# Patient Record
Sex: FEMALE | Race: WHITE | Employment: OTHER | URBAN - METROPOLITAN AREA
[De-identification: names, ages, dates, MRNs, and addresses within clinical notes are randomized per-mention and may not be internally consistent; named-entity substitution may affect disease eponyms.]

---

## 2017-12-28 PROBLEM — R07.9 CHEST PAIN OF UNCERTAIN ETIOLOGY: Status: ACTIVE | Noted: 2017-12-28

## 2017-12-30 PROBLEM — I10 ESSENTIAL HYPERTENSION: Status: ACTIVE | Noted: 2017-12-30

## 2017-12-30 PROBLEM — E78.5 LIPIDS BLOOD INCREASED: Status: ACTIVE | Noted: 2017-12-30

## 2017-12-30 PROBLEM — E03.9 ACQUIRED HYPOTHYROIDISM: Status: ACTIVE | Noted: 2017-12-30

## 2017-12-30 PROBLEM — M54.12 RADICULITIS OF LEFT CERVICAL REGION: Status: ACTIVE | Noted: 2017-12-30

## 2019-03-25 RX ORDER — GABAPENTIN 300 MG/1
300 CAPSULE ORAL 2 TIMES DAILY
COMMUNITY
End: 2020-08-06

## 2019-03-25 RX ORDER — DULOXETIN HYDROCHLORIDE 30 MG/1
30 CAPSULE, DELAYED RELEASE ORAL DAILY
COMMUNITY
End: 2020-08-06

## 2019-03-25 RX ORDER — LEVOTHYROXINE SODIUM 88 UG/1
88 TABLET ORAL DAILY
COMMUNITY
End: 2020-08-06

## 2019-03-25 NOTE — PROGRESS NOTES
[facility] PRE-ADMISSION TESTING INSTRUCTIONS    The Preadmission Testing patient is instructed accordingly using the following criteria (check applicable):    ARRIVAL INSTRUCTIONS:  [x] Parking the day of Surgery is located in the Main Entrance lot. Upon entering the door, make an immediate right to the surgery reception desk    [x] Complimentary [place] Parking is available Monday through Friday 6 am to 6 pm    [x] Bring photo ID and insurance card    [] Bring in a copy of Living will or Durable Power of  papers. [x] Please be sure to arrange for responsible adult to provide transportation to and from the hospital    [x] Please arrange for responsible adult to be with you for the 24 hour period post procedure due to having anesthesia      GENERAL INSTRUCTIONS:    [x] Nothing by mouth after midnight, including gum, candy, mints or water    [x] You may brush your teeth, but do not swallow any water    [x] Take medications as instructed with 1-2 oz of water    [x] Stop herbal supplements and vitamins 5 days prior to procedure    [x] Follow preop dosing of blood thinners per physician instructions    [] Take 1/2 dose of evening insulin, but no insulin after midnight    [] No oral diabetic medications after midnight    [] If diabetic and have low blood sugar or feel symptomatic, take 1-2oz apple juice only    [] Bring inhalers day of surgery    [] Bring C-PAP/ Bi-Pap day of surgery    [] Bring urine specimen day of surgery    [x] Shower or bath with soap, lather and rinse well, AM of Surgery, no lotion, powders or creams to surgical site    [] Follow bowel prep as instructed per surgeon    [x] No tobacco products within 24 hours of surgery     [x] No alcohol or illegal drug use within 24 hours of surgery.     [x] Jewelry, body piercing's, eyeglasses, contact lenses and dentures are not permitted into surgery (bring cases)      [x] Please do not wear any nail polish, make up or hair

## 2019-04-02 ENCOUNTER — ANESTHESIA EVENT (OUTPATIENT)
Dept: OPERATING ROOM | Age: 69
End: 2019-04-02
Payer: MEDICARE

## 2019-04-02 ENCOUNTER — HOSPITAL ENCOUNTER (OUTPATIENT)
Age: 69
Setting detail: OUTPATIENT SURGERY
Discharge: HOME | End: 2019-04-02
Attending: OPHTHALMOLOGY | Admitting: OPHTHALMOLOGY
Payer: MEDICARE

## 2019-04-02 ENCOUNTER — ANESTHESIA (OUTPATIENT)
Dept: OPERATING ROOM | Age: 69
End: 2019-04-02
Payer: MEDICARE

## 2019-04-02 VITALS — SYSTOLIC BLOOD PRESSURE: 188 MMHG | DIASTOLIC BLOOD PRESSURE: 76 MMHG | OXYGEN SATURATION: 100 %

## 2019-04-02 VITALS
SYSTOLIC BLOOD PRESSURE: 170 MMHG | HEIGHT: 59 IN | DIASTOLIC BLOOD PRESSURE: 74 MMHG | HEART RATE: 66 BPM | WEIGHT: 145 LBS | RESPIRATION RATE: 16 BRPM | TEMPERATURE: 98.9 F | BODY MASS INDEX: 29.23 KG/M2 | OXYGEN SATURATION: 97 %

## 2019-04-02 PROBLEM — H25.812 COMBINED FORMS OF AGE-RELATED CATARACT OF LEFT EYE: Status: ACTIVE | Noted: 2019-04-02

## 2019-04-02 PROCEDURE — 2500000003 HC RX 250 WO HCPCS: Performed by: OPHTHALMOLOGY

## 2019-04-02 PROCEDURE — 7100000010 HC PHASE II RECOVERY - FIRST 15 MIN: Performed by: OPHTHALMOLOGY

## 2019-04-02 PROCEDURE — 3600000012 HC SURGERY LEVEL 2 ADDTL 15MIN: Performed by: OPHTHALMOLOGY

## 2019-04-02 PROCEDURE — 7100000011 HC PHASE II RECOVERY - ADDTL 15 MIN: Performed by: OPHTHALMOLOGY

## 2019-04-02 PROCEDURE — 6370000000 HC RX 637 (ALT 250 FOR IP): Performed by: OPHTHALMOLOGY

## 2019-04-02 PROCEDURE — 6360000002 HC RX W HCPCS: Performed by: OPHTHALMOLOGY

## 2019-04-02 PROCEDURE — 2709999900 HC NON-CHARGEABLE SUPPLY: Performed by: OPHTHALMOLOGY

## 2019-04-02 PROCEDURE — 3700000000 HC ANESTHESIA ATTENDED CARE: Performed by: OPHTHALMOLOGY

## 2019-04-02 PROCEDURE — 3700000001 HC ADD 15 MINUTES (ANESTHESIA): Performed by: OPHTHALMOLOGY

## 2019-04-02 PROCEDURE — 3600000002 HC SURGERY LEVEL 2 BASE: Performed by: OPHTHALMOLOGY

## 2019-04-02 PROCEDURE — V2632 POST CHMBR INTRAOCULAR LENS: HCPCS | Performed by: OPHTHALMOLOGY

## 2019-04-02 PROCEDURE — 6360000002 HC RX W HCPCS: Performed by: NURSE ANESTHETIST, CERTIFIED REGISTERED

## 2019-04-02 PROCEDURE — 2580000003 HC RX 258: Performed by: OPHTHALMOLOGY

## 2019-04-02 DEVICE — LENS INTOCU +26.0 DIOPT L13MM DIA6MM 0DEG HAPTIC ANG A: Type: IMPLANTABLE DEVICE | Site: EYE | Status: FUNCTIONAL

## 2019-04-02 RX ORDER — MOXIFLOXACIN 5 MG/ML
SOLUTION/ DROPS OPHTHALMIC PRN
Status: DISCONTINUED | OUTPATIENT
Start: 2019-04-02 | End: 2019-04-02 | Stop reason: ALTCHOICE

## 2019-04-02 RX ORDER — SODIUM CHLORIDE 9 MG/ML
INJECTION, SOLUTION INTRAVENOUS CONTINUOUS
Status: DISCONTINUED | OUTPATIENT
Start: 2019-04-02 | End: 2019-04-02 | Stop reason: HOSPADM

## 2019-04-02 RX ORDER — TROPICAMIDE 10 MG/ML
1 SOLUTION/ DROPS OPHTHALMIC
Status: COMPLETED | OUTPATIENT
Start: 2019-04-02 | End: 2019-04-02

## 2019-04-02 RX ORDER — KETOROLAC TROMETHAMINE 5 MG/ML
1 SOLUTION OPHTHALMIC
Status: COMPLETED | OUTPATIENT
Start: 2019-04-02 | End: 2019-04-02

## 2019-04-02 RX ORDER — MOXIFLOXACIN 5 MG/ML
1 SOLUTION/ DROPS OPHTHALMIC
Status: COMPLETED | OUTPATIENT
Start: 2019-04-02 | End: 2019-04-02

## 2019-04-02 RX ORDER — TETRACAINE HYDROCHLORIDE 5 MG/ML
SOLUTION OPHTHALMIC PRN
Status: DISCONTINUED | OUTPATIENT
Start: 2019-04-02 | End: 2019-04-02 | Stop reason: ALTCHOICE

## 2019-04-02 RX ORDER — CYCLOPENTOLATE HYDROCHLORIDE 10 MG/ML
1 SOLUTION/ DROPS OPHTHALMIC
Status: COMPLETED | OUTPATIENT
Start: 2019-04-02 | End: 2019-04-02

## 2019-04-02 RX ORDER — FENTANYL CITRATE 50 UG/ML
INJECTION, SOLUTION INTRAMUSCULAR; INTRAVENOUS PRN
Status: DISCONTINUED | OUTPATIENT
Start: 2019-04-02 | End: 2019-04-02 | Stop reason: SDUPTHER

## 2019-04-02 RX ORDER — PROPOFOL 10 MG/ML
INJECTION, EMULSION INTRAVENOUS PRN
Status: DISCONTINUED | OUTPATIENT
Start: 2019-04-02 | End: 2019-04-02 | Stop reason: SDUPTHER

## 2019-04-02 RX ORDER — PHENYLEPHRINE HCL 2.5 %
1 DROPS OPHTHALMIC (EYE)
Status: COMPLETED | OUTPATIENT
Start: 2019-04-02 | End: 2019-04-02

## 2019-04-02 RX ORDER — PREDNISOLONE ACETATE 10 MG/ML
SUSPENSION/ DROPS OPHTHALMIC PRN
Status: DISCONTINUED | OUTPATIENT
Start: 2019-04-02 | End: 2019-04-02 | Stop reason: ALTCHOICE

## 2019-04-02 RX ADMIN — KETOROLAC TROMETHAMINE 1 DROP: 5 SOLUTION OPHTHALMIC at 09:43

## 2019-04-02 RX ADMIN — Medication 1 DROP: at 09:42

## 2019-04-02 RX ADMIN — CYCLOPENTOLATE HYDROCHLORIDE 1 DROP: 10 SOLUTION/ DROPS OPHTHALMIC at 09:35

## 2019-04-02 RX ADMIN — PROPOFOL 100 MG: 10 INJECTION, EMULSION INTRAVENOUS at 09:51

## 2019-04-02 RX ADMIN — CYCLOPENTOLATE HYDROCHLORIDE 1 DROP: 10 SOLUTION/ DROPS OPHTHALMIC at 09:43

## 2019-04-02 RX ADMIN — Medication 1 DROP: at 09:35

## 2019-04-02 RX ADMIN — Medication 1 DROP: at 09:10

## 2019-04-02 RX ADMIN — CYCLOPENTOLATE HYDROCHLORIDE 1 DROP: 10 SOLUTION/ DROPS OPHTHALMIC at 09:20

## 2019-04-02 RX ADMIN — FENTANYL CITRATE 100 MCG: 50 INJECTION, SOLUTION INTRAMUSCULAR; INTRAVENOUS at 10:23

## 2019-04-02 RX ADMIN — KETOROLAC TROMETHAMINE 1 DROP: 5 SOLUTION OPHTHALMIC at 09:35

## 2019-04-02 RX ADMIN — PHENYLEPHRINE HYDROCHLORIDE 1 DROP: 25 SOLUTION/ DROPS OPHTHALMIC at 09:42

## 2019-04-02 RX ADMIN — KETOROLAC TROMETHAMINE 1 DROP: 5 SOLUTION OPHTHALMIC at 09:20

## 2019-04-02 RX ADMIN — CYCLOPENTOLATE HYDROCHLORIDE 1 DROP: 10 SOLUTION/ DROPS OPHTHALMIC at 09:10

## 2019-04-02 RX ADMIN — KETOROLAC TROMETHAMINE 1 DROP: 5 SOLUTION OPHTHALMIC at 09:10

## 2019-04-02 RX ADMIN — SODIUM CHLORIDE: 9 INJECTION, SOLUTION INTRAVENOUS at 09:46

## 2019-04-02 RX ADMIN — PHENYLEPHRINE HYDROCHLORIDE 1 DROP: 25 SOLUTION/ DROPS OPHTHALMIC at 09:10

## 2019-04-02 RX ADMIN — PHENYLEPHRINE HYDROCHLORIDE 1 DROP: 25 SOLUTION/ DROPS OPHTHALMIC at 09:20

## 2019-04-02 RX ADMIN — Medication 1 DROP: at 09:20

## 2019-04-02 RX ADMIN — PHENYLEPHRINE HYDROCHLORIDE 1 DROP: 25 SOLUTION/ DROPS OPHTHALMIC at 09:35

## 2019-04-02 ASSESSMENT — PULMONARY FUNCTION TESTS
PIF_VALUE: 0
PIF_VALUE: 1
PIF_VALUE: 0

## 2019-04-02 ASSESSMENT — PAIN SCALES - GENERAL
PAINLEVEL_OUTOF10: 0

## 2019-04-02 ASSESSMENT — PAIN - FUNCTIONAL ASSESSMENT: PAIN_FUNCTIONAL_ASSESSMENT: 0-10

## 2019-04-02 NOTE — ANESTHESIA PRE PROCEDURE
Department of Anesthesiology  Preprocedure Note       Name:  Yola Hampton   Age:  70 y.o.  :  1950                                          MRN:  [id number]         Date:  2019      Surgeon: Quinn Zimmerman):  Fenton Dancer, MD    Procedure: LEFT EYE CATARACT EXTRACTION WITH  IOL IMPLANT (Left Eye)    Medications prior to admission:   Prior to Admission medications    Medication Sig Start Date End Date Taking? Authorizing Provider   levothyroxine (SYNTHROID) 88 MCG tablet Take 88 mcg by mouth Daily   Yes Historical Provider, MD   DULoxetine (CYMBALTA) 30 MG extended release capsule Take 30 mg by mouth daily Instructed to take am of procedure   Yes Historical Provider, MD   ezetimibe (ZETIA) 10 MG tablet Take 10 mg by mouth daily   Yes Historical Provider, MD   loratadine (CLARITIN) 10 MG tablet Take 10 mg by mouth daily   Yes Historical Provider, MD   atorvastatin (LIPITOR) 10 MG tablet Take 10 mg by mouth three times a week On Monday, Wednesday, and Friday   Yes Historical Provider, MD   temazepam (RESTORIL) 15 MG capsule Take 15 mg by mouth nightly as needed for Sleep . Yes Historical Provider, MD   montelukast (SINGULAIR) 10 MG tablet Take 10 mg by mouth nightly   Yes Historical Provider, MD   diltiazem (DILACOR XR) 240 MG XR capsule Take 240 mg by mouth daily Instructed to take am of procedure   Yes Historical Provider, MD   colestipol (COLESTID) 1 G tablet Take 1 g by mouth daily   Yes Historical Provider, MD   hydrochlorothiazide (MICROZIDE) 12.5 MG capsule Take 12.5 mg by mouth daily Instructed to take am of procedure   Yes Historical Provider, MD   gabapentin (NEURONTIN) 300 MG capsule Take 300 mg by mouth 2 times daily.  Instructed to take am of procedure    Historical Provider, MD   Triamcinolone Acetonide (NASACORT ALLERGY 24HR NA) by Nasal route    Historical Provider, MD   ibuprofen (ADVIL;MOTRIN) 600 MG tablet Take 1 tablet by mouth 4 times daily (with meals and nightly) 17 Nisa Arreaga DO   meclizine (ANTIVERT) 25 MG tablet Take 25 mg by mouth 3 times daily as needed for Dizziness    Historical Provider, MD   promethazine (PHENERGAN) 25 MG tablet Take 25 mg by mouth every 6 hours as needed for Nausea    Historical Provider, MD   aspirin 81 MG tablet Take 81 mg by mouth daily Ld 3/21/2019 per dr. Maria Isabel Booth Provider, MD   Calcium Carb-Cholecalciferol (CALCIUM + D3 PO) Take 2 tablets by mouth daily Calcium 500 mg /Vitamin D3 1000 units  Ld 3/27/2019    Historical Provider, MD       Current medications:    Current Facility-Administered Medications   Medication Dose Route Frequency Provider Last Rate Last Dose    tropicamide (MYDRIACYL) 1 % ophthalmic solution 1 drop  1 drop Left Eye Q15 Min PRN Cherelle Balbuena MD   1 drop at 04/02/19 0920    phenylephrine (MYDFRIN) 2.5 % ophthalmic solution 1 drop  1 drop Left Eye Q15 Min PRN Cherelle Balbuena MD   1 drop at 04/02/19 0920    cyclopentolate (CYCLOGYL) 1 % ophthalmic solution 1 drop  1 drop Left Eye Q15 Min PRN Cherelle Balbuena MD   1 drop at 04/02/19 0920    ketorolac (ACULAR) 0.5 % ophthalmic solution 1 drop  1 drop Left Eye Q15 Min PRN Cherelle Balbuena MD   1 drop at 04/02/19 0920    0.9 % sodium chloride infusion   Intravenous Continuous Cherelle Balbuena MD        moxifloxacin (VIGAMOX) 0.5 % ophthalmic solution 1 drop  1 drop Left Eye Q15 Min PRN Cherelle Balbuena MD   1 drop at 04/02/19 0920       Allergies:  No Known Allergies    Problem List:    Patient Active Problem List   Diagnosis Code    Chest pain of uncertain etiology Z31.36    Essential hypertension I10    Lipids blood increased E78.5    Radiculitis of left cervical region M54.12    Acquired hypothyroidism E03.9       Past Medical History:        Diagnosis Date    Acquired hypothyroidism 12/30/2017    Cataract, left eye     Essential hypertension 12/30/2017    Hyperlipidemia     Hypertension     Radiculitis of left cervical region 2017    Thyroid disease        Past Surgical History:        Procedure Laterality Date     SECTION      x 2    COLONOSCOPY      EYE SURGERY Right 2016    removal of cataract right eye with IOL implant    PELVIC LAPAROSCOPY         Social History:    Social History     Tobacco Use    Smoking status: Former Smoker     Last attempt to quit: 2011     Years since quittin.1    Smokeless tobacco: Never Used   Substance Use Topics    Alcohol use: Yes     Comment: weekends / 2 glasses wine                                Counseling given: Not Answered      Vital Signs (Current):   Vitals:    19 0925 19 0916   BP:  (!) 168/74   Pulse:  66   Resp:  16   Temp:  97.2 °F (36.2 °C)   TempSrc:  Temporal   SpO2:  97%   Weight: 145 lb (65.8 kg) 145 lb (65.8 kg)   Height: 4' 11\" (1.499 m) 4' 11\" (1.499 m)                                              BP Readings from Last 3 Encounters:   19 (!) 168/74   17 (!) 145/66   16 139/65       NPO Status: Time of last liquid consumption: 1700                        Time of last solid consumption: 1700                        Date of last liquid consumption: 19                        Date of last solid food consumption: 19    BMI:   Wt Readings from Last 3 Encounters:   19 145 lb (65.8 kg)   17 140 lb 8 oz (63.7 kg)   16 145 lb (65.8 kg)     Body mass index is 29.29 kg/m².     CBC:   Lab Results   Component Value Date    WBC 11.5 2017    RBC 4.30 2017    HGB 13.3 2017    HCT 40.0 2017    MCV 93.0 2017    RDW 13.0 2017     2017       CMP:   Lab Results   Component Value Date     2017    K 4.0 2017     2017    CO2 23 2017    BUN 18 2017    CREATININE 0.8 2017    GFRAA >60 2017    LABGLOM >60 2017    GLUCOSE 100 2017    PROT 6.2 2017    CALCIUM 8.3 12/29/2017    BILITOT 0.3 12/29/2017    ALKPHOS 67 12/29/2017    AST 13 12/29/2017    ALT 14 12/29/2017       POC Tests: No results for input(s): POCGLU, POCNA, POCK, POCCL, POCBUN, POCHEMO, POCHCT in the last 72 hours. Coags: No results found for: PROTIME, INR, APTT    HCG (If Applicable): No results found for: PREGTESTUR, PREGSERUM, HCG, HCGQUANT     ABGs: No results found for: PHART, PO2ART, GCH2QTT, CPF8SYQ, BEART, O8YWIAKS     Type & Screen (If Applicable):  No results found for: [facility]    Anesthesia Evaluation  Patient summary reviewed no history of anesthetic complications:   Airway: Mallampati: II  TM distance: >3 FB   Neck ROM: full  Mouth opening: > = 3 FB Dental:    (+) caps      Pulmonary:Negative Pulmonary ROS breath sounds clear to auscultation                             Cardiovascular:    (+) hypertension:, hyperlipidemia        Rhythm: regular             Beta Blocker:  Not on Beta Blocker         Neuro/Psych:   (+) neuromuscular disease:, psychiatric history: stable with treatment            GI/Hepatic/Renal: Neg GI/Hepatic/Renal ROS            Endo/Other:    (+) hypothyroidism::., .                 Abdominal:           Vascular: negative vascular ROS. Anesthesia Plan      MAC     ASA 3       Induction: intravenous. Anesthetic plan and risks discussed with patient. Plan discussed with CRNA.             [address] Sarath Prieto,    4/2/2019

## 2019-04-02 NOTE — ANESTHESIA POSTPROCEDURE EVALUATION
Department of Anesthesiology  Postprocedure Note    Patient: Tiffanie Rodriguez  MRN: [id number]  Birthdate: [birth year]  Date of evaluation: 4/2/2019  Time:  1:13 PM     Procedure Summary     Date:  04/02/19 Room / Location:  [facility] OR 03 / [facility] OR    Anesthesia Start:  0946 Anesthesia Stop:  9341    Procedure:  LEFT EYE CATARACT EXTRACTION WITH  IOL IMPLANT (Left Eye) Diagnosis:  (LEFT EYE CATARACT)    Surgeon:  Luis Fernando aLrkin MD Responsible Provider:  Ethan Frank DO    Anesthesia Type:  MAC ASA Status:  3          Anesthesia Type: MAC    Mary Ann Phase I: Mary Ann Score: 10    Mary Ann Phase II: Mary Ann Score: 10    Last vitals: Reviewed and per EMR flowsheets.        Anesthesia Post Evaluation    Patient location during evaluation: PACU  Patient participation: complete - patient participated  Level of consciousness: awake and alert  Airway patency: patent  Nausea & Vomiting: no nausea and no vomiting  Complications: no  Cardiovascular status: hemodynamically stable  Respiratory status: acceptable  Hydration status: euvolemic

## 2019-04-02 NOTE — OP NOTE
needle was then used to create the capsulorhexis. The capsule was then very carefully removed. Hydrodissection with hydrodelineation were the performed in the four quadrants with sterile balanced salt solution. The nucleus and epinucleus were then removed with phacoemulsification. Poor iris tone noted. The residual cortex was then removed using an aspiration/irrigation unit. The capsular bag was then reconstituted with Provisc. A posterior chamber intraocular lens was then injected into the capsular bag. An SN 60 WF 26.0 D lens was used. The aspiration-irrigation united was then used to remove the residual Provisc. The wound was then tested and found to be watertight. One sutures was then placed through the flap, then closed and tied. The iris hooks were then removed. The pupil was then brought down with Miostat. The conjunctiva was then carefully draped over the surgical wound and then cauterized. The lid speculum  was removed. Pred Forte 1% solution,  Vigamox drops and TobraDex ointment  were then instilled into the eye. The eye was noted to be in good condition. A patch and shield were then placed over the operated eye. The patient was transferred to the recovery room in good condition. Lb Corrigan M.D.

## 2020-02-03 ENCOUNTER — OFFICE VISIT (OUTPATIENT)
Dept: FAMILY MEDICINE CLINIC | Age: 70
End: 2020-02-03
Payer: MEDICARE

## 2020-02-03 VITALS
DIASTOLIC BLOOD PRESSURE: 70 MMHG | WEIGHT: 149 LBS | HEART RATE: 86 BPM | BODY MASS INDEX: 30.09 KG/M2 | SYSTOLIC BLOOD PRESSURE: 150 MMHG | RESPIRATION RATE: 16 BRPM | OXYGEN SATURATION: 97 % | TEMPERATURE: 97.7 F

## 2020-02-03 PROCEDURE — 99213 OFFICE O/P EST LOW 20 MIN: CPT | Performed by: FAMILY MEDICINE

## 2020-02-03 RX ORDER — FAMCICLOVIR 500 MG/1
500 TABLET, FILM COATED ORAL 3 TIMES DAILY
Qty: 21 TABLET | Refills: 0 | Status: SHIPPED | OUTPATIENT
Start: 2020-02-03 | End: 2020-02-10

## 2020-02-03 ASSESSMENT — ENCOUNTER SYMPTOMS
RESPIRATORY NEGATIVE: 1
EYES NEGATIVE: 1

## 2020-02-03 NOTE — PROGRESS NOTES
2/3/20  Alexander Whitehead : 1950 Sex: female  Age: 78 y.o. Chief Complaint   Patient presents with    Rash     Rash on Rt side near ribs. States rash feels burning and hot. Onset 3 days       A 69-year-old white female with a chief complaint of rash in the left intercostal area associated with pain is a maculopapular rash with no vesiculation. Review of Systems   HENT: Negative. Eyes: Negative. Respiratory: Negative. Cardiovascular: Negative. Skin: Positive for rash. Current Outpatient Medications:     famciclovir (FAMVIR) 500 MG tablet, Take 1 tablet by mouth 3 times daily for 7 days, Disp: 21 tablet, Rfl: 0    gabapentin (NEURONTIN) 300 MG capsule, Take 300 mg by mouth 2 times daily.  Instructed to take am of procedure, Disp: , Rfl:     levothyroxine (SYNTHROID) 88 MCG tablet, Take 88 mcg by mouth Daily, Disp: , Rfl:     Triamcinolone Acetonide (NASACORT ALLERGY 24HR NA), by Nasal route, Disp: , Rfl:     DULoxetine (CYMBALTA) 30 MG extended release capsule, Take 30 mg by mouth daily Instructed to take am of procedure, Disp: , Rfl:     ibuprofen (ADVIL;MOTRIN) 600 MG tablet, Take 1 tablet by mouth 4 times daily (with meals and nightly), Disp: 120 tablet, Rfl: 3    ezetimibe (ZETIA) 10 MG tablet, Take 10 mg by mouth daily, Disp: , Rfl:     loratadine (CLARITIN) 10 MG tablet, Take 10 mg by mouth daily, Disp: , Rfl:     atorvastatin (LIPITOR) 10 MG tablet, Take 10 mg by mouth three times a week On Monday, Wednesday, and Friday, Disp: , Rfl:     meclizine (ANTIVERT) 25 MG tablet, Take 25 mg by mouth 3 times daily as needed for Dizziness, Disp: , Rfl:     promethazine (PHENERGAN) 25 MG tablet, Take 25 mg by mouth every 6 hours as needed for Nausea, Disp: , Rfl:     temazepam (RESTORIL) 15 MG capsule, Take 15 mg by mouth nightly as needed for Sleep ., Disp: , Rfl:     montelukast (SINGULAIR) 10 MG tablet, Take 10 mg by mouth nightly, Disp: , Rfl:     diltiazem (DILACOR XR) 240 MG XR capsule, Take 240 mg by mouth daily Instructed to take am of procedure, Disp: , Rfl:     aspirin 81 MG tablet, Take 81 mg by mouth daily Ld 3/21/2019 per dr. Sarah Escobedo: , Rfl:     colestipol (COLESTID) 1 G tablet, Take 1 g by mouth daily, Disp: , Rfl:     hydrochlorothiazide (MICROZIDE) 12.5 MG capsule, Take 12.5 mg by mouth daily Instructed to take am of procedure, Disp: , Rfl:     Calcium Carb-Cholecalciferol (CALCIUM + D3 PO), Take 2 tablets by mouth daily Calcium 500 mg /Vitamin D3 1000 units Ld 3/27/2019, Disp: , Rfl:   No Known Allergies    Past Medical History:   Diagnosis Date    Acquired hypothyroidism 2017    Cataract, left eye     Essential hypertension 2017    Hyperlipidemia     Hypertension     Radiculitis of left cervical region 2017    Thyroid disease      Past Surgical History:   Procedure Laterality Date     SECTION      x 2    COLONOSCOPY      EYE SURGERY Right 2016    removal of cataract right eye with IOL implant    INTRACAPSULAR CATARACT EXTRACTION Left 2019    LEFT EYE CATARACT EXTRACTION WITH  IOL IMPLANT performed by Eric Koyanagi, [facility] at [facility]       No family history on file. Social History     Tobacco Use    Smoking status: Former Smoker     Last attempt to quit: 2011     Years since quittin.0    Smokeless tobacco: Never Used   Substance Use Topics    Alcohol use: Yes     Comment: weekends / 2 glasses wine    Drug use: No        Vitals:    20 1250   BP: (!) 150/70   Pulse: 86   Resp: 16   Temp: 97.7 °F (36.5 °C)   TempSrc: Temporal   SpO2: 97%   Weight: 149 lb (67.6 kg)       Physical Exam  Constitutional:       Appearance: Normal appearance. HENT:      Head: Normocephalic. Skin:     Findings: Erythema, lesion and rash present. Neurological:      Mental Status: She is alert. Assessment and Plan:  Kike Crandall was seen today for rash.     Diagnoses and all

## 2020-08-06 ENCOUNTER — OFFICE VISIT (OUTPATIENT)
Dept: PRIMARY CARE CLINIC | Age: 70
End: 2020-08-06
Payer: MEDICARE

## 2020-08-06 VITALS
OXYGEN SATURATION: 98 % | WEIGHT: 144 LBS | SYSTOLIC BLOOD PRESSURE: 144 MMHG | HEART RATE: 82 BPM | DIASTOLIC BLOOD PRESSURE: 68 MMHG | TEMPERATURE: 97.7 F | BODY MASS INDEX: 29.08 KG/M2

## 2020-08-06 PROBLEM — E78.5 LIPIDS BLOOD INCREASED: Status: RESOLVED | Noted: 2017-12-30 | Resolved: 2020-08-06

## 2020-08-06 PROBLEM — R07.9 CHEST PAIN OF UNCERTAIN ETIOLOGY: Status: RESOLVED | Noted: 2017-12-28 | Resolved: 2020-08-06

## 2020-08-06 PROBLEM — H25.812 COMBINED FORMS OF AGE-RELATED CATARACT OF LEFT EYE: Status: RESOLVED | Noted: 2019-04-02 | Resolved: 2020-08-06

## 2020-08-06 PROCEDURE — 99204 OFFICE O/P NEW MOD 45 MIN: CPT | Performed by: FAMILY MEDICINE

## 2020-08-06 RX ORDER — MONTELUKAST SODIUM 4 MG/1
TABLET, CHEWABLE ORAL
COMMUNITY
End: 2021-02-08

## 2020-08-06 RX ORDER — HYDROCHLOROTHIAZIDE 12.5 MG/1
TABLET ORAL
COMMUNITY
End: 2020-08-06 | Stop reason: SDUPTHER

## 2020-08-06 RX ORDER — LEVOTHYROXINE SODIUM 88 UG/1
88 TABLET ORAL DAILY
Qty: 90 TABLET | Refills: 1 | Status: SHIPPED
Start: 2020-08-06 | End: 2021-02-08

## 2020-08-06 RX ORDER — ATORVASTATIN CALCIUM 10 MG/1
10 TABLET, FILM COATED ORAL
Qty: 36 TABLET | Refills: 1 | Status: SHIPPED
Start: 2020-08-07 | End: 2021-09-28

## 2020-08-06 RX ORDER — LEVOTHYROXINE SODIUM 0.07 MG/1
TABLET ORAL
COMMUNITY
End: 2020-08-06 | Stop reason: SDUPTHER

## 2020-08-06 RX ORDER — EZETIMIBE 10 MG/1
10 TABLET ORAL DAILY
Qty: 90 TABLET | Refills: 1 | Status: SHIPPED
Start: 2020-08-06 | End: 2021-02-08

## 2020-08-06 RX ORDER — ACETAMINOPHEN 500 MG
TABLET ORAL
COMMUNITY

## 2020-08-06 RX ORDER — GABAPENTIN 300 MG/1
CAPSULE ORAL
COMMUNITY
Start: 2019-02-19 | End: 2020-08-06

## 2020-08-06 RX ORDER — DILTIAZEM HYDROCHLORIDE 240 MG/1
240 CAPSULE, EXTENDED RELEASE ORAL DAILY
Qty: 90 CAPSULE | Refills: 1 | Status: SHIPPED
Start: 2020-08-06 | End: 2021-02-08

## 2020-08-06 RX ORDER — CHOLECALCIFEROL (VITAMIN D3) 1250 MCG
CAPSULE ORAL
COMMUNITY

## 2020-08-06 RX ORDER — DILTIAZEM HYDROCHLORIDE 240 MG/1
CAPSULE, COATED, EXTENDED RELEASE ORAL
COMMUNITY
Start: 2020-07-17 | End: 2020-08-06

## 2020-08-06 RX ORDER — DILTIAZEM HYDROCHLORIDE 240 MG/1
CAPSULE, EXTENDED RELEASE ORAL
COMMUNITY
End: 2020-08-06 | Stop reason: SDUPTHER

## 2020-08-06 RX ORDER — HYDROCHLOROTHIAZIDE 12.5 MG/1
12.5 TABLET ORAL DAILY
Qty: 90 TABLET | Refills: 1 | Status: SHIPPED
Start: 2020-08-06 | End: 2021-02-08

## 2020-08-06 ASSESSMENT — ENCOUNTER SYMPTOMS
COUGH: 0
CONSTIPATION: 0
ABDOMINAL PAIN: 0
WHEEZING: 0
SHORTNESS OF BREATH: 0
DIARRHEA: 0
NAUSEA: 0
BACK PAIN: 1
VOMITING: 0

## 2020-08-06 NOTE — PROGRESS NOTES
20  Bernice Whitehead : 1950 Sex: female  Age: 78 y.o. Chief Complaint   Patient presents with   Reynaldo Becerra Doctor     HPI:  78 y.o. female presents today to establish with a new PCP. Previously seen by Dr. Mary Spangler. Patient's chart, medical, surgical and medication history all reviewed. Hypertension   The patient presents today for follow up of HTN. The problem is borderline controlled. Risk factors for coronary artery disease include Age > 27, HTN and elevated cholesterol. Current treatments include diltiazem (Cardizem) and hydrochlorothiazide (HCTZ). The patient is compliant all of the time. Lifestyle changes the patient has made include none. Today the patient is complaining of none. Hypothyroidism  Patient presents for routine follow up of Hypothyroidism. Current symptoms: none. Patient denies change in energy level, diarrhea, heat / cold intolerance, nervousness, palpitations and weight changes. Symptoms have been well-controlled. No difficulty swallowing or masses felt. Last TSH was 2.980. Patient is currently taking levothyroxine 88 mcg. Hyperlipidemia  The 10-year ASCVD risk score (Rose Matamoros, et al., 2013) is: 14.6%    Values used to calculate the score:      Age: 78 years      Sex: Female      Is Non- : No      Diabetic: No      Tobacco smoker: No      Systolic Blood Pressure: 365 mmHg      Is BP treated: Yes      HDL Cholesterol: 60 mg/dL      Total Cholesterol: 162 mg/dL      ROS:  Review of Systems   Constitutional: Negative for chills, fatigue and fever. Respiratory: Negative for cough, shortness of breath and wheezing. Cardiovascular: Negative for chest pain and palpitations. Gastrointestinal: Negative for abdominal pain, constipation, diarrhea, nausea and vomiting. Musculoskeletal: Positive for arthralgias and back pain. Negative for gait problem. Skin: Negative for rash.    Neurological: Negative for dizziness and headaches. Psychiatric/Behavioral: Negative for dysphoric mood. The patient is not nervous/anxious. All other systems reviewed and are negative. Current Outpatient Medications on File Prior to Visit   Medication Sig Dispense Refill    Calcium Carbonate-Vit D-Min (CALCIUM 1200) 9353-8301 MG-UNIT CHEW       Cholecalciferol (VITAMIN D3) 1.25 MG (77963 UT) CAPS Take by mouth      colestipol (COLESTID) 1 g tablet Take by mouth      Triamcinolone Acetonide (NASACORT ALLERGY 24HR NA) by Nasal route      ibuprofen (ADVIL;MOTRIN) 600 MG tablet Take 1 tablet by mouth 4 times daily (with meals and nightly) 120 tablet 3    loratadine (CLARITIN) 10 MG tablet Take 10 mg by mouth daily      meclizine (ANTIVERT) 25 MG tablet Take 25 mg by mouth 3 times daily as needed for Dizziness      temazepam (RESTORIL) 15 MG capsule Take 15 mg by mouth nightly as needed for Sleep .  aspirin 81 MG tablet Take 81 mg by mouth daily Ld 3/21/2019 per dr. Dion Do       No current facility-administered medications on file prior to visit.         No Known Allergies    Past Medical History:   Diagnosis Date    Acquired hypothyroidism 2017    Cataract, left eye     Essential hypertension 2017    Hyperlipidemia     Hypertension     Lumbar spinal stenosis     Radiculitis of left cervical region 2017    Thyroid disease      Past Surgical History:   Procedure Laterality Date    CATARACT REMOVAL WITH IMPLANT Right 2016     SECTION      x 2    COLONOSCOPY      INTRACAPSULAR CATARACT EXTRACTION Left 2019    LEFT EYE CATARACT EXTRACTION WITH  IOL IMPLANT performed by [facility]alino Hamilton [facility] OR    PELVIC LAPAROSCOPY       Family History   Problem Relation Age of Onset    Heart Disease Mother     Other Father         sepsis from TKA    COPD Father     Hypothyroidism Father     Heart Attack Maternal Grandmother     Heart Attack Maternal Grandfather     Cancer Maternal Aunt Hodgkin lymphoma    No Known Problems Brother      Social History     Socioeconomic History    Marital status:      Spouse name: Not on file    Number of children: Not on file    Years of education: Not on file    Highest education level: Not on file   Occupational History    Not on file   Social Needs    Financial resource strain: Not on file    Food insecurity     Worry: Not on file     Inability: Not on file    Transportation needs     Medical: Not on file     Non-medical: Not on file   Tobacco Use    Smoking status: Former Smoker     Last attempt to quit: 2011     Years since quittin.5    Smokeless tobacco: Never Used   Substance and Sexual Activity    Alcohol use: Yes     Comment: weekends / 2 glasses wine    Drug use: No    Sexual activity: Not on file   Lifestyle    Physical activity     Days per week: Not on file     Minutes per session: Not on file    Stress: Not on file   Relationships    Social connections     Talks on phone: Not on file     Gets together: Not on file     Attends [employer] service: Not on file     Active member of club or organization: Not on file     Attends meetings of clubs or organizations: Not on file     Relationship status: Not on file    Intimate partner violence     Fear of current or ex partner: Not on file     Emotionally abused: Not on file     Physically abused: Not on file     Forced sexual activity: Not on file   Other Topics Concern    Not on file   Social History Narrative    Not on file       Vitals:    20 1323   BP: (!) 144/68   Pulse: 82   Temp: 97.7 °F (36.5 °C)   SpO2: 98%   Weight: 144 lb (65.3 kg)       Physical Exam:  Physical Exam  Vitals signs and nursing note reviewed. Constitutional:       General: She is not in acute distress. Appearance: Normal appearance. She is well-developed and normal weight. She is not ill-appearing. HENT:      Head: Normocephalic and atraumatic.       Right Ear: Hearing and external ear normal.      Left Ear: Hearing and external ear normal.      Nose:      Comments: Wearing mask  Eyes:      General: Lids are normal. No scleral icterus. Extraocular Movements: Extraocular movements intact. Conjunctiva/sclera: Conjunctivae normal.   Neck:      Musculoskeletal: Normal range of motion and neck supple. Thyroid: No thyromegaly. Cardiovascular:      Rate and Rhythm: Normal rate and regular rhythm. Heart sounds: Normal heart sounds. No murmur. Pulmonary:      Effort: Pulmonary effort is normal. No respiratory distress. Breath sounds: Normal breath sounds. No wheezing. Musculoskeletal: Normal range of motion. General: No tenderness or deformity. Right lower leg: No edema. Left lower leg: No edema. Lymphadenopathy:      Cervical: No cervical adenopathy. Skin:     General: Skin is warm and dry. Findings: No rash. Neurological:      General: No focal deficit present. Mental Status: She is alert and oriented to person, place, and time. Gait: Gait normal.   Psychiatric:         Mood and Affect: Mood and affect normal.         Speech: Speech normal.         Behavior: Behavior normal.         Thought Content:  Thought content normal.         Labs:  CBC with Differential:    Lab Results   Component Value Date    WBC 11.5 12/29/2017    RBC 4.30 12/29/2017    HGB 13.3 12/29/2017    HCT 40.0 12/29/2017     12/29/2017    MCV 93.0 12/29/2017    MCH 30.9 12/29/2017    MCHC 33.3 12/29/2017    RDW 13.0 12/29/2017    LYMPHOPCT 49.6 12/29/2017    MONOPCT 6.5 12/29/2017    BASOPCT 0.3 12/29/2017    MONOSABS 0.75 12/29/2017    LYMPHSABS 5.72 12/29/2017    EOSABS 0.07 12/29/2017    BASOSABS 0.03 12/29/2017     CMP:    Lab Results   Component Value Date     12/29/2017    K 4.0 12/29/2017     12/29/2017    CO2 23 12/29/2017    BUN 18 12/29/2017    CREATININE 0.8 12/29/2017    GFRAA >60 12/29/2017    LABGLOM >60 12/29/2017 GLUCOSE 100 12/29/2017    PROT 6.2 12/29/2017    LABALBU 3.8 12/29/2017    CALCIUM 8.3 12/29/2017    BILITOT 0.3 12/29/2017    ALKPHOS 67 12/29/2017    AST 13 12/29/2017    ALT 14 12/29/2017     Uric Acid:  No results found for: [employer]  HgBA1c:    Lab Results   Component Value Date    LABA1C 5.4 12/28/2017     Microalbumen/Creatinine ratio:  No components found for: RUCREAT  FLP:    Lab Results   Component Value Date    TRIG 99 12/29/2017    HDL 60 12/29/2017    LDLCALC 82 12/29/2017    LABVLDL 20 12/29/2017     TSH:    Lab Results   Component Value Date    TSH 7.250 12/29/2017        Assessment and Plan:  Javier Delarosa was seen today for established new doctor. Diagnoses and all orders for this visit:    Essential hypertension  -     hydrochlorothiazide (HYDRODIURIL) 12.5 MG tablet; Take 1 tablet by mouth daily  -     dilTIAZem (DILT-XR) 240 MG extended release capsule; Take 1 capsule by mouth daily  -     CBC Auto Differential; Future  -     Comprehensive Metabolic Panel; Future  -     Urinalysis; Future  -     Uric Acid; Future  Stable. Patient had labs and AWV in March with Dr. Jayleen Metcalf. Will check labs in 6 months. Acquired hypothyroidism  -     levothyroxine (SYNTHROID) 88 MCG tablet; Take 1 tablet by mouth Daily  -     TSH without Reflex; Future  -     T4, Free; Future  Stable    Mixed hyperlipidemia  -     ezetimibe (ZETIA) 10 MG tablet; Take 1 tablet by mouth daily  -     atorvastatin (LIPITOR) 10 MG tablet; Take 1 tablet by mouth three times a week On Monday, Wednesday, and Friday  -     Lipid Panel; Future    Vitamin D insufficiency  -     Vitamin D 25 Hydroxy; Future    Impaired fasting glucose  -     Hemoglobin A1C; Future        Return in about 6 months (around 2/6/2021) for AWV.       Seen By:  Good Every, DO

## 2020-10-09 ENCOUNTER — NURSE ONLY (OUTPATIENT)
Dept: PRIMARY CARE CLINIC | Age: 70
End: 2020-10-09
Payer: MEDICARE

## 2020-10-09 PROCEDURE — 90694 VACC AIIV4 NO PRSRV 0.5ML IM: CPT | Performed by: FAMILY MEDICINE

## 2020-10-09 PROCEDURE — G0008 ADMIN INFLUENZA VIRUS VAC: HCPCS | Performed by: FAMILY MEDICINE

## 2020-12-28 ENCOUNTER — OFFICE VISIT (OUTPATIENT)
Dept: FAMILY MEDICINE CLINIC | Age: 70
End: 2020-12-28
Payer: MEDICARE

## 2020-12-28 VITALS
BODY MASS INDEX: 30 KG/M2 | HEART RATE: 90 BPM | DIASTOLIC BLOOD PRESSURE: 80 MMHG | OXYGEN SATURATION: 97 % | WEIGHT: 148.8 LBS | HEIGHT: 59 IN | TEMPERATURE: 97.6 F | SYSTOLIC BLOOD PRESSURE: 132 MMHG | RESPIRATION RATE: 16 BRPM

## 2020-12-28 PROCEDURE — 99214 OFFICE O/P EST MOD 30 MIN: CPT | Performed by: PHYSICIAN ASSISTANT

## 2020-12-28 PROCEDURE — 10060 I&D ABSCESS SIMPLE/SINGLE: CPT | Performed by: PHYSICIAN ASSISTANT

## 2020-12-28 RX ORDER — CEPHALEXIN 500 MG/1
500 CAPSULE ORAL 4 TIMES DAILY
Qty: 40 CAPSULE | Refills: 0 | Status: SHIPPED | OUTPATIENT
Start: 2020-12-28 | End: 2021-01-07

## 2020-12-28 RX ORDER — SULFAMETHOXAZOLE AND TRIMETHOPRIM 800; 160 MG/1; MG/1
1 TABLET ORAL 2 TIMES DAILY
Qty: 20 TABLET | Refills: 0 | Status: SHIPPED | OUTPATIENT
Start: 2020-12-28 | End: 2021-01-07

## 2020-12-28 NOTE — PROGRESS NOTES
LEFT EYE CATARACT EXTRACTION WITH  IOL IMPLANT performed by Dee Munoz MD at [facility] OR    PELVIC LAPAROSCOPY         Family History   Problem Relation Age of Onset    Heart Disease Mother     Other Father         sepsis from TKA    COPD Father     Hypothyroidism Father     Heart Attack Maternal Grandmother     Heart Attack Maternal Grandfather     Cancer Maternal Aunt         Hodgkin lymphoma    No Known Problems Brother        Medications:     Current Outpatient Medications:     Calcium Carbonate-Vit D-Min (CALCIUM 1200) 7587-0022 MG-UNIT CHEW, , Disp: , Rfl:     Cholecalciferol (VITAMIN D3) 1.25 MG (25664 UT) CAPS, Take by mouth, Disp: , Rfl:     colestipol (COLESTID) 1 g tablet, Take by mouth, Disp: , Rfl:     Triamcinolone Acetonide (NASACORT ALLERGY 24HR NA), by Nasal route, Disp: , Rfl:     ibuprofen (ADVIL;MOTRIN) 600 MG tablet, Take 1 tablet by mouth 4 times daily (with meals and nightly), Disp: 120 tablet, Rfl: 3    loratadine (CLARITIN) 10 MG tablet, Take 10 mg by mouth daily, Disp: , Rfl:     meclizine (ANTIVERT) 25 MG tablet, Take 25 mg by mouth 3 times daily as needed for Dizziness, Disp: , Rfl:     temazepam (RESTORIL) 15 MG capsule, Take 15 mg by mouth nightly as needed for Sleep ., Disp: , Rfl:     aspirin 81 MG tablet, Take 81 mg by mouth daily Ld 3/21/2019 per dr. Kim Acosta, Disp: , Rfl:     hydrochlorothiazide (HYDRODIURIL) 12.5 MG tablet, Take 1 tablet by mouth daily, Disp: 90 tablet, Rfl: 1    dilTIAZem (DILT-XR) 240 MG extended release capsule, Take 1 capsule by mouth daily, Disp: 90 capsule, Rfl: 1    levothyroxine (SYNTHROID) 88 MCG tablet, Take 1 tablet by mouth Daily, Disp: 90 tablet, Rfl: 1    ezetimibe (ZETIA) 10 MG tablet, Take 1 tablet by mouth daily, Disp: 90 tablet, Rfl: 1    atorvastatin (LIPITOR) 10 MG tablet, Take 1 tablet by mouth three times a week On Monday, Wednesday, and Friday, Disp: 36 tablet, Rfl: 1    Allergies:   No Known Allergies Social History:     Social History     Tobacco Use    Smoking status: Former Smoker     Quit date: 2011     Years since quittin.9    Smokeless tobacco: Never Used   Substance Use Topics    Alcohol use: Yes     Comment: weekends / 2 glasses wine    Drug use: No       Patient lives at home. Physical Exam:     Vitals:    20 0900   BP: 132/80   Pulse: 90   Resp: 16   Temp: 97.6 °F (36.4 °C)   SpO2: 97%   Weight: 148 lb 12.8 oz (67.5 kg)   Height: 4' 11\" (1.499 m)       Exam:  Physical Exam  Vital signs reviewed and nurse's notes. The patient is not hypoxic. General: Alert, no acute distress, patient resting comfortably   Skin: warm, intact, no pallor noted   Head: Normocephalic, atraumatic   Eye: Normal conjunctiva   Respiratory: No acute distress   Musculoskeletal: There is no obvious deformity noted to the right foot or to the right great toe but the patient does have evidence of a paronychia and ingrown toenail to the right great toe. The patient does have evidence of a small area of a pustule on the lateral nail fold. There is diffuse tenderness in the area. The patient also has evidence of onychomycosis to multiple other toenails. There also seems to be some erythema noted to the inner portions of the inner digital spacing. Pulses intact at DP/PT 2+. The patient had no calf tenderness. Neurological: alert and orient x4, normal sensory and motor observed. Psychiatric: Cooperative        Testing:           Medical Decision Making:     Vital signs reviewed    Past medical history reviewed. Allergies reviewed. Medications reviewed. Patient on arrival does not appear to be in any apparent distress or discomfort. The patient wanted to have the nail cut out today. She did not want to wait for an appointment with podiatry. The patient consented to the procedure.     Procedure: The patient was prepped and draped in a sterile fashion the patient had the use of iodine to prep and clean the area. Using 1% lidocaine a 3 sided digital block was performed. Approximately 8 cc were used to anesthetize the area. The patient was still having some slight tenderness with palpation only inserted 1 additional cc of lidocaine on the medial portion of the right great toe. The patient tolerated this well and had good anesthetic value with the injection. The patient was able to have the lateral corner clipped off with nail nippers. The patient tolerated this well. We will also able to minimally drain the paronychia on that side. There is only a very small amount of purulent material that was able to be expressed. We cleaned and irrigated the wound. Topical antibiotic ointment applied with a dry sterile dressing. The patient will be placed on Bactrim and Keflex. The patient is to do warm water and Epson salt soaks 3-4 times daily. The patient is to change the dressing that amount of time as well. The patient will call with any questions or concerns. I would hold off on further treatment of the athlete's foot until this can clear up. The patient was comfortable with the plan has no other questions or concerns. Clinical Impression:   Jeff Marie was seen today for toe pain. Diagnoses and all orders for this visit:    Ingrown right greater toenail    Paronychia of great toe of right foot    Other orders  -     sulfamethoxazole-trimethoprim (BACTRIM DS;SEPTRA DS) 800-160 MG per tablet; Take 1 tablet by mouth 2 times daily for 10 days  -     cephALEXin (KEFLEX) 500 MG capsule; Take 1 capsule by mouth 4 times daily for 10 days  -     mupirocin (BACTROBAN) 2 % ointment; Apply 3 times daily. The patient is to call for any concerns or return if any of the signs or symptoms worsen. The patient is to follow-up with PCP in the next 2-3 days for repeat evaluation repeat assessment or go directly to the emergency department.      SIGNATURE: Ximena Call III, PA-C

## 2021-02-01 ENCOUNTER — IMMUNIZATION (OUTPATIENT)
Dept: PRIMARY CARE CLINIC | Age: 71
End: 2021-02-01
Payer: MEDICARE

## 2021-02-01 DIAGNOSIS — R73.01 IMPAIRED FASTING GLUCOSE: ICD-10-CM

## 2021-02-01 DIAGNOSIS — E78.2 MIXED HYPERLIPIDEMIA: ICD-10-CM

## 2021-02-01 DIAGNOSIS — E55.9 VITAMIN D INSUFFICIENCY: ICD-10-CM

## 2021-02-01 DIAGNOSIS — I10 ESSENTIAL HYPERTENSION: ICD-10-CM

## 2021-02-01 DIAGNOSIS — E03.9 ACQUIRED HYPOTHYROIDISM: ICD-10-CM

## 2021-02-01 DIAGNOSIS — Z23 NEED FOR VACCINATION: Primary | ICD-10-CM

## 2021-02-01 LAB
ALBUMIN SERPL-MCNC: 4.5 G/DL (ref 3.5–5.2)
ALP BLD-CCNC: 70 U/L (ref 35–104)
ALT SERPL-CCNC: 14 U/L (ref 0–32)
ANION GAP SERPL CALCULATED.3IONS-SCNC: 15 MMOL/L (ref 7–16)
AST SERPL-CCNC: 17 U/L (ref 0–31)
BASOPHILS ABSOLUTE: 0.04 E9/L (ref 0–0.2)
BASOPHILS RELATIVE PERCENT: 0.5 % (ref 0–2)
BILIRUB SERPL-MCNC: 0.3 MG/DL (ref 0–1.2)
BUN BLDV-MCNC: 25 MG/DL (ref 8–23)
CALCIUM SERPL-MCNC: 9.7 MG/DL (ref 8.6–10.2)
CHLORIDE BLD-SCNC: 109 MMOL/L (ref 98–107)
CHOLESTEROL, TOTAL: 176 MG/DL (ref 0–199)
CO2: 24 MMOL/L (ref 22–29)
CREAT SERPL-MCNC: 1 MG/DL (ref 0.5–1)
EOSINOPHILS ABSOLUTE: 0.08 E9/L (ref 0.05–0.5)
EOSINOPHILS RELATIVE PERCENT: 1.1 % (ref 0–6)
GFR AFRICAN AMERICAN: >60
GFR NON-AFRICAN AMERICAN: 55 ML/MIN/1.73
GLUCOSE BLD-MCNC: 100 MG/DL (ref 74–99)
HBA1C MFR BLD: 5.5 % (ref 4–5.6)
HCT VFR BLD CALC: 47 % (ref 34–48)
HDLC SERPL-MCNC: 61 MG/DL
HEMOGLOBIN: 15 G/DL (ref 11.5–15.5)
IMMATURE GRANULOCYTES #: 0.01 E9/L
IMMATURE GRANULOCYTES %: 0.1 % (ref 0–5)
LDL CHOLESTEROL CALCULATED: 75 MG/DL (ref 0–99)
LYMPHOCYTES ABSOLUTE: 4.24 E9/L (ref 1.5–4)
LYMPHOCYTES RELATIVE PERCENT: 57.1 % (ref 20–42)
MCH RBC QN AUTO: 30.9 PG (ref 26–35)
MCHC RBC AUTO-ENTMCNC: 31.9 % (ref 32–34.5)
MCV RBC AUTO: 96.9 FL (ref 80–99.9)
MONOCYTES ABSOLUTE: 0.48 E9/L (ref 0.1–0.95)
MONOCYTES RELATIVE PERCENT: 6.5 % (ref 2–12)
NEUTROPHILS ABSOLUTE: 2.57 E9/L (ref 1.8–7.3)
NEUTROPHILS RELATIVE PERCENT: 34.7 % (ref 43–80)
PDW BLD-RTO: 12.6 FL (ref 11.5–15)
PLATELET # BLD: 210 E9/L (ref 130–450)
PMV BLD AUTO: 12 FL (ref 7–12)
POTASSIUM SERPL-SCNC: 4.1 MMOL/L (ref 3.5–5)
RBC # BLD: 4.85 E12/L (ref 3.5–5.5)
SODIUM BLD-SCNC: 148 MMOL/L (ref 132–146)
T4 FREE: 1.55 NG/DL (ref 0.93–1.7)
TOTAL PROTEIN: 7 G/DL (ref 6.4–8.3)
TRIGL SERPL-MCNC: 199 MG/DL (ref 0–149)
TSH SERPL DL<=0.05 MIU/L-ACNC: 4.17 UIU/ML (ref 0.27–4.2)
URIC ACID, SERUM: 5.7 MG/DL (ref 2.4–5.7)
VITAMIN D 25-HYDROXY: 33 NG/ML (ref 30–100)
VLDLC SERPL CALC-MCNC: 40 MG/DL
WBC # BLD: 7.4 E9/L (ref 4.5–11.5)

## 2021-02-01 PROCEDURE — 91300 COVID-19, PFIZER VACCINE 30MCG/0.3ML DOSE: CPT | Performed by: CLINICAL NURSE SPECIALIST

## 2021-02-01 PROCEDURE — 0001A COVID-19, PFIZER VACCINE 30MCG/0.3ML DOSE: CPT | Performed by: CLINICAL NURSE SPECIALIST

## 2021-02-08 ENCOUNTER — OFFICE VISIT (OUTPATIENT)
Dept: PRIMARY CARE CLINIC | Age: 71
End: 2021-02-08
Payer: MEDICARE

## 2021-02-08 VITALS
HEART RATE: 87 BPM | BODY MASS INDEX: 29.64 KG/M2 | OXYGEN SATURATION: 98 % | TEMPERATURE: 98.4 F | DIASTOLIC BLOOD PRESSURE: 82 MMHG | WEIGHT: 147 LBS | HEIGHT: 59 IN | SYSTOLIC BLOOD PRESSURE: 156 MMHG

## 2021-02-08 DIAGNOSIS — E78.2 MIXED HYPERLIPIDEMIA: ICD-10-CM

## 2021-02-08 DIAGNOSIS — I10 ESSENTIAL HYPERTENSION: ICD-10-CM

## 2021-02-08 DIAGNOSIS — F51.01 PRIMARY INSOMNIA: ICD-10-CM

## 2021-02-08 DIAGNOSIS — Z00.00 ROUTINE GENERAL MEDICAL EXAMINATION AT A HEALTH CARE FACILITY: Primary | ICD-10-CM

## 2021-02-08 DIAGNOSIS — H81.90 EPISODIC RECURRENT VERTIGO: ICD-10-CM

## 2021-02-08 DIAGNOSIS — E03.9 ACQUIRED HYPOTHYROIDISM: ICD-10-CM

## 2021-02-08 DIAGNOSIS — R73.01 IMPAIRED FASTING GLUCOSE: ICD-10-CM

## 2021-02-08 DIAGNOSIS — E55.9 VITAMIN D INSUFFICIENCY: ICD-10-CM

## 2021-02-08 PROCEDURE — 99214 OFFICE O/P EST MOD 30 MIN: CPT | Performed by: FAMILY MEDICINE

## 2021-02-08 PROCEDURE — G0439 PPPS, SUBSEQ VISIT: HCPCS | Performed by: FAMILY MEDICINE

## 2021-02-08 RX ORDER — HYDROCHLOROTHIAZIDE 25 MG/1
25 TABLET ORAL DAILY
Qty: 90 TABLET | Refills: 1 | Status: SHIPPED
Start: 2021-02-08 | End: 2021-08-05

## 2021-02-08 RX ORDER — HYDROCHLOROTHIAZIDE 12.5 MG/1
12.5 TABLET ORAL DAILY
COMMUNITY
End: 2021-02-08 | Stop reason: SDUPTHER

## 2021-02-08 RX ORDER — PROMETHAZINE HYDROCHLORIDE 25 MG/1
25 TABLET ORAL EVERY 6 HOURS PRN
COMMUNITY
End: 2021-02-08 | Stop reason: SDUPTHER

## 2021-02-08 RX ORDER — ATORVASTATIN CALCIUM 10 MG/1
10 TABLET, FILM COATED ORAL
Qty: 36 TABLET | Refills: 1 | Status: SHIPPED
Start: 2021-02-08 | End: 2021-08-05

## 2021-02-08 RX ORDER — MECLIZINE HYDROCHLORIDE 25 MG/1
25 TABLET ORAL 3 TIMES DAILY PRN
Qty: 270 TABLET | Refills: 0 | Status: SHIPPED | OUTPATIENT
Start: 2021-02-08

## 2021-02-08 RX ORDER — TEMAZEPAM 15 MG/1
15 CAPSULE ORAL NIGHTLY PRN
Qty: 90 CAPSULE | Refills: 0 | Status: SHIPPED
Start: 2021-02-08 | End: 2021-09-01

## 2021-02-08 RX ORDER — ATORVASTATIN CALCIUM 10 MG/1
10 TABLET, FILM COATED ORAL
COMMUNITY
End: 2021-02-08 | Stop reason: SDUPTHER

## 2021-02-08 RX ORDER — EZETIMIBE 10 MG/1
10 TABLET ORAL DAILY
COMMUNITY
End: 2021-02-08 | Stop reason: SDUPTHER

## 2021-02-08 RX ORDER — LEVOTHYROXINE SODIUM 0.1 MG/1
100 TABLET ORAL DAILY
Qty: 90 TABLET | Refills: 1 | Status: SHIPPED
Start: 2021-02-08 | End: 2021-07-16

## 2021-02-08 RX ORDER — DILTIAZEM HYDROCHLORIDE 240 MG/1
240 CAPSULE, EXTENDED RELEASE ORAL DAILY
Qty: 90 CAPSULE | Refills: 1 | Status: SHIPPED
Start: 2021-02-08 | End: 2021-08-05 | Stop reason: SDUPTHER

## 2021-02-08 RX ORDER — EZETIMIBE 10 MG/1
10 TABLET ORAL
Qty: 36 TABLET | Refills: 1 | Status: SHIPPED
Start: 2021-02-08 | End: 2021-09-28

## 2021-02-08 RX ORDER — TERBINAFINE HYDROCHLORIDE 250 MG/1
250 TABLET ORAL DAILY
COMMUNITY
End: 2021-09-28

## 2021-02-08 RX ORDER — DILTIAZEM HYDROCHLORIDE 240 MG/1
240 CAPSULE, EXTENDED RELEASE ORAL DAILY
COMMUNITY
End: 2021-02-08 | Stop reason: SDUPTHER

## 2021-02-08 RX ORDER — LEVOTHYROXINE SODIUM 88 UG/1
88 TABLET ORAL DAILY
COMMUNITY
End: 2021-02-08 | Stop reason: SDUPTHER

## 2021-02-08 RX ORDER — PROMETHAZINE HYDROCHLORIDE 25 MG/1
25 TABLET ORAL EVERY 6 HOURS PRN
Qty: 45 TABLET | Refills: 1 | Status: SHIPPED | OUTPATIENT
Start: 2021-02-08 | End: 2021-02-22

## 2021-02-08 ASSESSMENT — LIFESTYLE VARIABLES
HOW OFTEN DURING THE LAST YEAR HAVE YOU FAILED TO DO WHAT WAS NORMALLY EXPECTED FROM YOU BECAUSE OF DRINKING: NEVER
HOW OFTEN DO YOU HAVE A DRINK CONTAINING ALCOHOL: MONTHLY OR LESS
HOW OFTEN DURING THE LAST YEAR HAVE YOU FOUND THAT YOU WERE NOT ABLE TO STOP DRINKING ONCE YOU HAD STARTED: NEVER
HOW OFTEN DURING THE LAST YEAR HAVE YOU BEEN UNABLE TO REMEMBER WHAT HAPPENED THE NIGHT BEFORE BECAUSE YOU HAD BEEN DRINKING: 0
AUDIT TOTAL SCORE: 1
AUDIT-C TOTAL SCORE: 0
HOW OFTEN DURING THE LAST YEAR HAVE YOU HAD A FEELING OF GUILT OR REMORSE AFTER DRINKING: 0
AUDIT-C TOTAL SCORE: 1
HOW OFTEN DURING THE LAST YEAR HAVE YOU HAD A FEELING OF GUILT OR REMORSE AFTER DRINKING: NEVER
HAVE YOU OR SOMEONE ELSE BEEN INJURED AS A RESULT OF YOUR DRINKING: NO
HOW MANY STANDARD DRINKS CONTAINING ALCOHOL DO YOU HAVE ON A TYPICAL DAY: ONE OR TWO
HOW OFTEN DURING THE LAST YEAR HAVE YOU BEEN UNABLE TO REMEMBER WHAT HAPPENED THE NIGHT BEFORE BECAUSE YOU HAD BEEN DRINKING: NEVER
AUDIT TOTAL SCORE: 0
HOW OFTEN DURING THE LAST YEAR HAVE YOU NEEDED AN ALCOHOLIC DRINK FIRST THING IN THE MORNING TO GET YOURSELF GOING AFTER A NIGHT OF HEAVY DRINKING: NEVER
HOW OFTEN DURING THE LAST YEAR HAVE YOU FOUND THAT YOU WERE NOT ABLE TO STOP DRINKING ONCE YOU HAD STARTED: 0
HOW OFTEN DO YOU HAVE SIX OR MORE DRINKS ON ONE OCCASION: NEVER
HAS A RELATIVE, FRIEND, DOCTOR, OR ANOTHER HEALTH PROFESSIONAL EXPRESSED CONCERN ABOUT YOUR DRINKING OR SUGGESTED YOU CUT DOWN: NO
HAS A RELATIVE, FRIEND, DOCTOR, OR ANOTHER HEALTH PROFESSIONAL EXPRESSED CONCERN ABOUT YOUR DRINKING OR SUGGESTED YOU CUT DOWN: 0
HAVE YOU OR SOMEONE ELSE BEEN INJURED AS A RESULT OF YOUR DRINKING: 0

## 2021-02-08 ASSESSMENT — PATIENT HEALTH QUESTIONNAIRE - PHQ9: SUM OF ALL RESPONSES TO PHQ QUESTIONS 1-9: 0

## 2021-02-08 NOTE — PATIENT INSTRUCTIONS
Personalized Preventive Plan for Darrick Zamora - 2/8/2021  Medicare offers a range of preventive health benefits. Some of the tests and screenings are paid in full while other may be subject to a deductible, co-insurance, and/or copay. Some of these benefits include a comprehensive review of your medical history including lifestyle, illnesses that may run in your family, and various assessments and screenings as appropriate. After reviewing your medical record and screening and assessments performed today your provider may have ordered immunizations, labs, imaging, and/or referrals for you. A list of these orders (if applicable) as well as your Preventive Care list are included within your After Visit Summary for your review. Other Preventive Recommendations:    · A preventive eye exam performed by an eye specialist is recommended every 1-2 years to screen for glaucoma; cataracts, macular degeneration, and other eye disorders. · A preventive dental visit is recommended every 6 months. · Try to get at least 150 minutes of exercise per week or 10,000 steps per day on a pedometer . · Order or download the FREE \"Exercise & Physical Activity: Your Everyday Guide\" from [employer]. Call 9-856.780.8487 or search [employer] online. · You need 3616-5016 mg of calcium and 8582-9448 IU of vitamin D per day. It is possible to meet your calcium requirement with diet alone, but a vitamin D supplement is usually necessary to meet this goal.  · When exposed to the sun, use a sunscreen that protects against both UVA and UVB radiation with an SPF of 30 or greater. Reapply every 2 to 3 hours or after sweating, drying off with a towel, or swimming. · Always wear a seat belt when traveling in a car. Always wear a helmet when riding a bicycle or motorcycle.

## 2021-02-08 NOTE — PROGRESS NOTES
Medicare Annual Wellness Visit  Name: Tesha Monique Date: 2021   MRN: [id number] Sex: Female   Age: 69 y.o. Ethnicity: Non-/Non    : 1950 Race: Joseph Whitehead is here for Medicare AWV    Screenings for behavioral, psychosocial and functional/safety risks, and cognitive dysfunction are all negative except as indicated below. These results, as well as other patient data from the [address] form, are documented in Flowsheets linked to this Encounter. 78 y.o. female also here for 6 month follow up of chronic medical conditions, medication refills and FBW results. Patient's chart, medical, surgical and medication history all reviewed. Hypertension   The patient presents today for follow up of HTN. The problem is poorly controlled. Risk factors for coronary artery disease include Age > 27, HTN and elevated cholesterol. Current treatments include diltiazem (Cardizem) and hydrochlorothiazide (HCTZ). The patient is compliant all of the time. Lifestyle changes the patient has made include none. Today the patient is complaining of none. Hypothyroidism  Patient presents for routine follow up of Hypothyroidism. Current symptoms: change in energy level and insomnia. Patient denies diarrhea, heat / cold intolerance, nervousness, palpitations and weight changes. Symptoms have progressed to a point and plateaued. No difficulty swallowing or masses felt. Last TSH was 4.170. Patient is currently taking levothyroxine 88 mcg.     Hyperlipidemia  The 10-year ASCVD risk score (Adan Figueroa., et al., 2013) is: 19.2%    Values used to calculate the score:      Age: 69 years      Sex: Female      Is Non- : No      Diabetic: No      Tobacco smoker: No      Systolic Blood Pressure: 447 mmHg      Is BP treated: Yes      HDL Cholesterol: 61 mg/dL      Total Cholesterol: 176 mg/dL        No Known Allergies      Prior to Visit Medications Medication Sig Taking? Authorizing Provider   terbinafine (LAMISIL) 250 MG tablet Take 250 mg by mouth daily Yes Historical Provider, MD   dilTIAZem (DILACOR XR) 240 MG extended release capsule Take 1 capsule by mouth daily Yes Lucio Cruz DO   ezetimibe (ZETIA) 10 MG tablet Take 1 tablet by mouth three times a week Yes Scarlet Corral DO   hydroCHLOROthiazide (HYDRODIURIL) 25 MG tablet Take 1 tablet by mouth daily Yes Scarlet Corral DO   levothyroxine (SYNTHROID) 100 MCG tablet Take 1 tablet by mouth Daily Yes Scarlet Corral DO   atorvastatin (LIPITOR) 10 MG tablet Take 1 tablet by mouth three times a week On Monday, Wednesday,and Friday Yes Scarlet Corral DO   meclizine (ANTIVERT) 25 MG tablet Take 1 tablet by mouth 3 times daily as needed for Dizziness Yes Scarlet Corral DO   temazepam (RESTORIL) 15 MG capsule Take 1 capsule by mouth nightly as needed for Sleep for up to 90 days.  Yes Scarlet Corral DO   promethazine (PHENERGAN) 25 MG tablet Take 1 tablet by mouth every 6 hours as needed for Nausea Yes Scarlet Corral DO   Calcium Carbonate-Vit D-Min (CALCIUM 1200) 4704-1231 MG-UNIT CHEW  Yes Historical Provider, MD   Cholecalciferol (VITAMIN D3) 1.25 MG (11210 UT) CAPS Take by mouth Yes Historical Provider, MD   Triamcinolone Acetonide (NASACORT ALLERGY 24HR NA) by Nasal route Yes Historical Provider, MD   loratadine (CLARITIN) 10 MG tablet Take 10 mg by mouth daily Yes Historical Provider, MD   aspirin 81 MG tablet Take 81 mg by mouth daily Ld 3/21/2019 per dr. Anette Howard Yes Historical Provider, MD   atorvastatin (LIPITOR) 10 MG tablet Take 1 tablet by mouth three times a week On Monday, Wednesday, and Friday  Lucio Cruz DO         Past Medical History:   Diagnosis Date    Acquired hypothyroidism 12/30/2017    Cataract, left eye     Essential hypertension 12/30/2017    Hyperlipidemia     Hypertension     Lumbar spinal stenosis     Radiculitis of left cervical region 12/30/2017    Thyroid disease        Past Surgical History:   Procedure Laterality Date    CATARACT REMOVAL WITH IMPLANT Right 2016     SECTION      x 2    COLONOSCOPY      INTRACAPSULAR CATARACT EXTRACTION Left 2019    LEFT EYE CATARACT EXTRACTION WITH  IOL IMPLANT [facility]    PELVIC LAPAROSCOPY           Family History   Problem Relation Age of Onset    Heart Disease Mother     Other Father         sepsis from TKA    COPD Father     Hypothyroidism Father     Heart Attack Maternal Grandmother     Heart Attack Maternal Grandfather     Cancer Maternal Aunt         Hodgkin lymphoma    No Known Problems Brother        CareTeam (Including outside providers/suppliers regularly involved in providing care):   Patient Care Team:  Trace Maravilla DO as PCP - General (Family Medicine)  Trace Maravilla DO as PCP - [facility] Nannette Rachel Provider    Wt Readings from Last 3 Encounters:   21 147 lb (66.7 kg)   20 148 lb 12.8 oz (67.5 kg)   20 144 lb (65.3 kg)     Vitals:    21 1035   BP: (!) 156/82   Pulse: 87   Temp: 98.4 °F (36.9 °C)   SpO2: 98%   Weight: 147 lb (66.7 kg)   Height: 4' 11\" (1.499 m)     Body mass index is 29.69 kg/m². Based upon direct observation of the patient, evaluation of cognition reveals recent and remote memory intact. Physical Exam  Vitals signs and nursing note reviewed. Constitutional:       General: She is not in acute distress. Appearance: Normal appearance. She is well-developed and normal weight. She is not ill-appearing. HENT:      Head: Normocephalic and atraumatic. Right Ear: Hearing and external ear normal.      Left Ear: Hearing and external ear normal.      Nose:      Comments: Wearing mask  Eyes:      General: Lids are normal. No scleral icterus. Extraocular Movements: Extraocular movements intact.       Conjunctiva/sclera: Conjunctivae normal.   Neck:      Musculoskeletal: Normal range of motion and neck supple. Thyroid: No thyromegaly. Cardiovascular:      Rate and Rhythm: Normal rate and regular rhythm. Heart sounds: Normal heart sounds. No murmur. Pulmonary:      Effort: Pulmonary effort is normal. No respiratory distress. Breath sounds: Normal breath sounds. No wheezing. Musculoskeletal: Normal range of motion. General: No tenderness or deformity. Right lower leg: No edema. Left lower leg: No edema. Lymphadenopathy:      Cervical: No cervical adenopathy. Skin:     General: Skin is warm and dry. Findings: No rash. Neurological:      General: No focal deficit present. Mental Status: She is alert and oriented to person, place, and time. Gait: Gait normal.   Psychiatric:         Mood and Affect: Mood and affect normal.         Speech: Speech normal.         Behavior: Behavior normal.         Thought Content: Thought content normal.         Patient's complete Health Risk Assessment and screening values have been reviewed and are found in Flowsheets. The following problems were reviewed today and where indicated follow up appointments were made and/or referrals ordered. Positive Risk Factor Screenings with Interventions:            General Health and ACP:  General  In general, how would you say your health is?: Good  In the past 7 days, have you experienced any of the following? New or Increased Pain, New or Increased Fatigue, Loneliness, Social Isolation, Stress or Anger?: None of These  Do you get the social and emotional support that you need?: Yes  Do you have a Living Will?: Yes  Advance Directives     Power of [address] [place] [address] Will ACP-Advance Directive ACP-Power of     Not on File Not on File Not on File Not on File      General Health Risk Interventions:  · Overall healthy 68 yo female.   Labs reviewed in detail    Health Habits/Nutrition:  Health Habits/Nutrition  Do you exercise for at least 20 minutes 2-3 times per week?: (!) No  Have you lost any weight without trying in the past 3 months?: No  Do you eat only one meal per day?: No  Have you seen the dentist within the past year?: Yes  Body mass index: (!) 29.69  Health Habits/Nutrition Interventions:  · Inadequate physical activity:  patient is not ready to increase his/her physical activity level at this time     Safety:  Safety  Do you have working smoke detectors?: Yes  Have all throw rugs been removed or fastened?: (!) No  Do you have non-slip mats or surfaces in all bathtubs/showers?: Yes  Do all of your stairways have a railing or banister?: Yes  Are your doorways, halls and stairs free of clutter?: Yes  Do you always fasten your seatbelt when you are in a car?: Yes  Safety Interventions:  · Home safety tips provided        Personalized Preventive Plan   Current Health Maintenance Status  Immunization History   Administered Date(s) Administered    COVID-19Soy, 30mcg/0.3ml Dose 02/01/2021    Influenza, High Dose (Fluzone 65 yrs and older) 10/03/2018    Influenza, Quadv, adjuvanted, 65 yrs +, IM, PF (Fluad) 10/09/2020    Pneumococcal Conjugate 13-valent ([id number]) 10/30/2015    Pneumococcal Polysaccharide ([id number]) 05/01/2018    Tdap (Boostrix, Adacel) 01/04/2012    Zoster Recombinant (Shingrix) 09/02/2020, 11/09/2020        Health Maintenance   Topic Date Due    Annual Wellness Visit (AWV)  06/23/2019    COVID-19 Vaccine (2 of 2 - Pfizer series) 02/22/2021    DTaP/Tdap/Td vaccine (2 - Td) 01/04/2022    Lipid screen  02/01/2022    TSH testing  02/01/2022    Potassium monitoring  02/01/2022    Creatinine monitoring  02/01/2022    Breast cancer screen  05/19/2022    Colon cancer screen colonoscopy  05/04/2025    DEXA (modify frequency per FRAX score)  Completed    Flu vaccine  Completed    Shingles Vaccine  Completed    Pneumococcal 65+ years Vaccine  Completed    Hepatitis A vaccine  Aged Out    Hepatitis B vaccine  Aged Out    Hib vaccine  Aged Out    Meningococcal (ACWY) vaccine  Aged Out    Hepatitis C screen  Discontinued     Recommendations for Preventive Services Due: see orders and patient instructions/AVS.    Recommended screening schedule for the next 5-10 years is provided to the patient in written form: see Patient Instructions/AVS.      Assessment and Plan  Emily Ordaz was seen today for medicare awv. Diagnoses and all orders for this visit:    Routine general medical examination at a health care facility  HRA reviewed and addressed. UTD on HM. Had first dose of COVID vaccine- due at the end of the month for #2. Labs reviewed in detail. Primary insomnia  -     temazepam (RESTORIL) 15 MG capsule; Take 1 capsule by mouth nightly as needed for Sleep for up to 90 days. Stable on Restoril. Essential hypertension  -     dilTIAZem (DILACOR XR) 240 MG extended release capsule; Take 1 capsule by mouth daily  -     hydroCHLOROthiazide (HYDRODIURIL) 25 MG tablet; Take 1 tablet by mouth daily  -     CBC Auto Differential; Future  -     Comprehensive Metabolic Panel; Future  -     Lipid Panel; Future  -     Urinalysis; Future  -     Comprehensive Metabolic Panel; Future  Uncontrolled at this time. Will increase dose to HCTZ and see if more helpful. Will need ACE/ARB if no better. Acquired hypothyroidism  -     levothyroxine (SYNTHROID) 100 MCG tablet; Take 1 tablet by mouth Daily  -     TSH without Reflex; Future  Given fatigue, will increase dose of Synthroid and see if more helpful. Mixed hyperlipidemia  -     ezetimibe (ZETIA) 10 MG tablet; Take 1 tablet by mouth three times a week  -     atorvastatin (LIPITOR) 10 MG tablet; Take 1 tablet by mouth three times a week On Monday, Wednesday,and Friday    Episodic recurrent vertigo  -     meclizine (ANTIVERT) 25 MG tablet; Take 1 tablet by mouth 3 times daily as needed for Dizziness  -     promethazine (PHENERGAN) 25 MG tablet;  Take 1 tablet by mouth every 6 hours as needed for

## 2021-02-22 ENCOUNTER — IMMUNIZATION (OUTPATIENT)
Dept: PRIMARY CARE CLINIC | Age: 71
End: 2021-02-22
Payer: MEDICARE

## 2021-02-22 PROCEDURE — 91300 COVID-19, PFIZER VACCINE 30MCG/0.3ML DOSE: CPT | Performed by: CLINICAL NURSE SPECIALIST

## 2021-02-22 PROCEDURE — 0002A COVID-19, PFIZER VACCINE 30MCG/0.3ML DOSE: CPT | Performed by: CLINICAL NURSE SPECIALIST

## 2021-03-08 DIAGNOSIS — I10 ESSENTIAL HYPERTENSION: ICD-10-CM

## 2021-03-08 LAB
ALBUMIN SERPL-MCNC: 4.7 G/DL (ref 3.5–5.2)
ALP BLD-CCNC: 80 U/L (ref 35–104)
ALT SERPL-CCNC: 15 U/L (ref 0–32)
ANION GAP SERPL CALCULATED.3IONS-SCNC: 10 MMOL/L (ref 7–16)
AST SERPL-CCNC: 19 U/L (ref 0–31)
BILIRUB SERPL-MCNC: 0.2 MG/DL (ref 0–1.2)
BUN BLDV-MCNC: 16 MG/DL (ref 8–23)
CALCIUM SERPL-MCNC: 10.6 MG/DL (ref 8.6–10.2)
CHLORIDE BLD-SCNC: 101 MMOL/L (ref 98–107)
CO2: 30 MMOL/L (ref 22–29)
CREAT SERPL-MCNC: 0.9 MG/DL (ref 0.5–1)
GFR AFRICAN AMERICAN: >60
GFR NON-AFRICAN AMERICAN: >60 ML/MIN/1.73
GLUCOSE BLD-MCNC: 110 MG/DL (ref 74–99)
POTASSIUM SERPL-SCNC: 3.9 MMOL/L (ref 3.5–5)
SODIUM BLD-SCNC: 141 MMOL/L (ref 132–146)
TOTAL PROTEIN: 7.7 G/DL (ref 6.4–8.3)

## 2021-09-01 DIAGNOSIS — F51.01 PRIMARY INSOMNIA: ICD-10-CM

## 2021-09-01 RX ORDER — TEMAZEPAM 15 MG/1
CAPSULE ORAL
Qty: 90 CAPSULE | Refills: 0 | Status: SHIPPED
Start: 2021-09-01 | End: 2022-01-30

## 2021-09-28 ENCOUNTER — OFFICE VISIT (OUTPATIENT)
Dept: PRIMARY CARE CLINIC | Age: 71
End: 2021-09-28
Payer: MEDICARE

## 2021-09-28 VITALS
SYSTOLIC BLOOD PRESSURE: 170 MMHG | TEMPERATURE: 97.8 F | HEART RATE: 89 BPM | HEIGHT: 59 IN | BODY MASS INDEX: 27.82 KG/M2 | OXYGEN SATURATION: 98 % | WEIGHT: 138 LBS | DIASTOLIC BLOOD PRESSURE: 82 MMHG

## 2021-09-28 DIAGNOSIS — Z23 NEED FOR INFLUENZA VACCINATION: ICD-10-CM

## 2021-09-28 DIAGNOSIS — K58.0 IRRITABLE BOWEL SYNDROME WITH DIARRHEA: ICD-10-CM

## 2021-09-28 DIAGNOSIS — E55.9 VITAMIN D INSUFFICIENCY: ICD-10-CM

## 2021-09-28 DIAGNOSIS — R73.01 IMPAIRED FASTING GLUCOSE: ICD-10-CM

## 2021-09-28 DIAGNOSIS — E78.2 MIXED HYPERLIPIDEMIA: ICD-10-CM

## 2021-09-28 DIAGNOSIS — I10 ESSENTIAL HYPERTENSION: Primary | ICD-10-CM

## 2021-09-28 DIAGNOSIS — E03.9 ACQUIRED HYPOTHYROIDISM: ICD-10-CM

## 2021-09-28 PROCEDURE — G0008 ADMIN INFLUENZA VIRUS VAC: HCPCS | Performed by: FAMILY MEDICINE

## 2021-09-28 PROCEDURE — 99214 OFFICE O/P EST MOD 30 MIN: CPT | Performed by: FAMILY MEDICINE

## 2021-09-28 PROCEDURE — 90694 VACC AIIV4 NO PRSRV 0.5ML IM: CPT | Performed by: FAMILY MEDICINE

## 2021-09-28 RX ORDER — EZETIMIBE 10 MG/1
10 TABLET ORAL
COMMUNITY
End: 2022-03-21

## 2021-09-28 RX ORDER — DICYCLOMINE HYDROCHLORIDE 10 MG/1
10 CAPSULE ORAL 4 TIMES DAILY
Qty: 360 CAPSULE | Refills: 1 | Status: SHIPPED | OUTPATIENT
Start: 2021-09-28

## 2021-09-28 RX ORDER — FAMOTIDINE 20 MG/1
20 TABLET, FILM COATED ORAL 2 TIMES DAILY
Qty: 180 TABLET | Refills: 1 | Status: SHIPPED | OUTPATIENT
Start: 2021-09-28

## 2021-09-28 RX ORDER — ESTRADIOL 10 UG/1
INSERT VAGINAL
COMMUNITY
Start: 2021-09-10

## 2021-09-28 RX ORDER — LISINOPRIL AND HYDROCHLOROTHIAZIDE 12.5; 1 MG/1; MG/1
1 TABLET ORAL DAILY
Qty: 90 TABLET | Refills: 1 | Status: SHIPPED
Start: 2021-09-28 | End: 2022-03-21

## 2021-09-28 ASSESSMENT — ENCOUNTER SYMPTOMS
ABDOMINAL PAIN: 1
COUGH: 0
NAUSEA: 0
DIARRHEA: 1
VOMITING: 0
BACK PAIN: 1
CONSTIPATION: 0
SHORTNESS OF BREATH: 0
WHEEZING: 0

## 2021-09-28 NOTE — PROGRESS NOTES
21  Olimpia Whitehead : 1950 Sex: female  Age: 66 y.o. Chief Complaint   Patient presents with    Hypertension    Sweats     pt states she has been getting hot flashes and is concerned it is her thyroid. HPI:  66 y.o. female presents today for 6 month follow up of chronic medical conditions, medication refills and FBW results. Patient's chart, medical, surgical and medication history all reviewed. Hypertension   The patient presents today for follow up of HTN. The problem is poorly controlled. Risk factors for coronary artery disease include Age > 27, HTN and elevated cholesterol. Current treatments include diltiazem (Cardizem) and hydrochlorothiazide (HCTZ). The patient is compliant all of the time. Lifestyle changes the patient has made include none. Today the patient is complaining of none. Hypothyroidism  Patient presents for routine follow up of Hypothyroidism. Current symptoms: diarrhea and hot flashes. Patient denies change in energy level, heat / cold intolerance, nervousness, palpitations and weight changes. Symptoms have been well-controlled. No difficulty swallowing or masses felt. Last TSH was 3.050. Patient is currently taking levothyroxine 100 mcg. Hyperlipidemia  The 10-year ASCVD risk score (Justin Valdez., et al., 2013) is: 25.4%    Values used to calculate the score:      Age: 66 years      Sex: Female      Is Non- : No      Diabetic: No      Tobacco smoker: No      Systolic Blood Pressure: 213 mmHg      Is BP treated: Yes      HDL Cholesterol: 56 mg/dL      Total Cholesterol: 185 mg/dL      ROS:  Review of Systems   Constitutional: Negative for chills, fatigue and fever. Respiratory: Negative for cough, shortness of breath and wheezing. Cardiovascular: Negative for chest pain and palpitations. Gastrointestinal: Positive for abdominal pain (intermittent) and diarrhea. Negative for constipation, nausea and vomiting. Endocrine:        Hot flashes   Musculoskeletal: Positive for arthralgias and back pain. Negative for gait problem. Skin: Negative for rash. Neurological: Negative for dizziness and headaches. Psychiatric/Behavioral: Negative for dysphoric mood. The patient is not nervous/anxious. All other systems reviewed and are negative. Current Outpatient Medications on File Prior to Visit   Medication Sig Dispense Refill    Estradiol (VAGIFEM) 10 MCG TABS vaginal tablet INSERT ONE TABLET in the VAGINA AT BEDTIME (nightly) for 2 weeks  then twice a week thereafter      ezetimibe (ZETIA) 10 MG tablet Take 10 mg by mouth three times a week      temazepam (RESTORIL) 15 MG capsule TAKE ONE CAPSULE BY MOUTH NIGHTLY AS NEEDED FOR SLEEP FOR UP TO 90 DAYS 90 capsule 0    atorvastatin (LIPITOR) 10 MG tablet TAKE ONE TABLET BY MOUTH 3 TIMES A WEEK ON MONDAY, WEDNESDAY AND FRIDAY 36 tablet 1    dilTIAZem (DILACOR XR) 240 MG extended release capsule Take 1 capsule by mouth daily 90 capsule 1    levothyroxine (SYNTHROID) 100 MCG tablet TAKE ONE TABLET BY MOUTH DAILY 90 tablet 1    meclizine (ANTIVERT) 25 MG tablet Take 1 tablet by mouth 3 times daily as needed for Dizziness 270 tablet 0    Calcium Carbonate-Vit D-Min (CALCIUM 1200) 5960-6033 MG-UNIT CHEW       Cholecalciferol (VITAMIN D3) 1.25 MG (64738 UT) CAPS Take by mouth      Triamcinolone Acetonide (NASACORT ALLERGY 24HR NA) by Nasal route      loratadine (CLARITIN) 10 MG tablet Take 10 mg by mouth daily      aspirin 81 MG tablet Take 81 mg by mouth daily Ld 3/21/2019 per dr. Derick Maldonado       No current facility-administered medications on file prior to visit.        No Known Allergies    Past Medical History:   Diagnosis Date    Acquired hypothyroidism 12/30/2017    Cataract, left eye     Essential hypertension 12/30/2017    Hyperlipidemia     Hypertension     Lumbar spinal stenosis     Radiculitis of left cervical region 12/30/2017    Thyroid disease      Past Surgical History:   Procedure Laterality Date    CATARACT REMOVAL WITH IMPLANT Right 2016     SECTION      x 2    COLONOSCOPY      INTRACAPSULAR CATARACT EXTRACTION Left 2019    LEFT EYE CATARACT EXTRACTION WITH  IOL IMPLANT performed [facility] Hui [facility]as [facility]       Family History   Problem Relation Age of Onset    Heart Disease Mother     Other Father         sepsis from TKA    COPD Father     Hypothyroidism Father     Heart Attack Maternal Grandmother     Heart Attack Maternal Grandfather     Cancer Maternal Aunt         Hodgkin lymphoma    No Known Problems Brother      Social History     Socioeconomic History    Marital status:      Spouse name: Not on file    Number of children: Not on file    Years of education: Not on file    Highest education level: Not on file   Occupational History    Not on file   Tobacco Use    Smoking status: Former Smoker     Packs/day: 0.50     Years: 40.00     Pack years: 20.00     Start date:      Quit date: 2011     Years since quitting: 10.6    Smokeless tobacco: Never Used   Vaping Use    Vaping Use: Never used   Substance and Sexual Activity    Alcohol use: Yes     Comment: weekends / 2 glasses wine    Drug use: No    Sexual activity: Not on file   Other Topics Concern    Not on file   Social History Narrative    Not on file     Social Determinants of Health     Financial Resource Strain:     Difficulty of Paying Living Expenses:    Food Insecurity:     Worried About Running Out of Food in the Last Year:     Ran Out of Food in the Last Year:    Transportation Needs:     Lack of Transportation (Medical):      Lack of Transportation (Non-Medical):    Physical Activity:     Days of Exercise per Week:     Minutes of Exercise per Session:    Stress:     Feeling of Stress :    Social Connections:     Frequency of Communication with Friends and Family:     Frequency of Social Gatherings with Friends and Family:     Attends Congregation Services:     Active Member of Clubs or Organizations:     Attends Club or Organization Meetings:     Marital Status:    Intimate Partner Violence:     Fear of Current or Ex-Partner:     Emotionally Abused:     Physically Abused:     Sexually Abused:        Vitals:    09/28/21 0918   BP: (!) 170/82   Pulse: 89   Temp: 97.8 °F (36.6 °C)   SpO2: 98%   Weight: 138 lb (62.6 kg)   Height: 4' 11\" (1.499 m)       Physical Exam:  Physical Exam  Vitals and nursing note reviewed. Constitutional:       General: She is not in acute distress. Appearance: Normal appearance. She is well-developed and normal weight. She is not ill-appearing. HENT:      Head: Normocephalic and atraumatic. Right Ear: Hearing and external ear normal.      Left Ear: Hearing and external ear normal.      Nose:      Comments: Wearing mask  Eyes:      General: Lids are normal. No scleral icterus. Extraocular Movements: Extraocular movements intact. Conjunctiva/sclera: Conjunctivae normal.   Neck:      Thyroid: No thyromegaly. Cardiovascular:      Rate and Rhythm: Normal rate and regular rhythm. Heart sounds: Normal heart sounds. No murmur heard. Pulmonary:      Effort: Pulmonary effort is normal. No respiratory distress. Breath sounds: Normal breath sounds. No wheezing. Musculoskeletal:         General: No tenderness or deformity. Normal range of motion. Cervical back: Normal range of motion and neck supple. Right lower leg: No edema. Left lower leg: No edema. Lymphadenopathy:      Cervical: No cervical adenopathy. Skin:     General: Skin is warm and dry. Findings: No rash. Neurological:      General: No focal deficit present. Mental Status: She is alert and oriented to person, place, and time.       Gait: Gait normal.   Psychiatric:         Mood and Affect: Mood and affect normal.         Speech: Speech normal.         Behavior: Behavior normal.         Thought Content: Thought content normal.         Labs:  CBC with Differential:    Lab Results   Component Value Date    WBC 8.3 09/21/2021    RBC 4.81 09/21/2021    HGB 15.0 09/21/2021    HCT 45.7 09/21/2021     09/21/2021    MCV 95.0 09/21/2021    MCH 31.2 09/21/2021    MCHC 32.8 09/21/2021    RDW 12.7 09/21/2021    LYMPHOPCT 51.8 09/21/2021    MONOPCT 5.6 09/21/2021    BASOPCT 0.4 09/21/2021    MONOSABS 0.47 09/21/2021    LYMPHSABS 4.32 09/21/2021    EOSABS 0.10 09/21/2021    BASOSABS 0.03 09/21/2021     CMP:    Lab Results   Component Value Date     09/21/2021    K 4.3 09/21/2021     09/21/2021    CO2 27 09/21/2021    BUN 14 09/21/2021    CREATININE 0.9 09/21/2021    GFRAA >60 09/21/2021    LABGLOM >60 09/21/2021    GLUCOSE 101 09/21/2021    PROT 6.3 09/21/2021    LABALBU 4.3 09/21/2021    CALCIUM 9.8 09/21/2021    BILITOT 0.3 09/21/2021    ALKPHOS 70 09/21/2021    AST 19 09/21/2021    ALT 13 09/21/2021     Uric Acid:    Lab Results   Component Value Date    LABURIC 5.7 02/01/2021     HgBA1c:    Lab Results   Component Value Date    LABA1C 5.3 09/21/2021     Microalbumen/Creatinine ratio:  No components found for: RUCREAT  FLP:    Lab Results   Component Value Date    TRIG 260 09/21/2021    HDL 56 09/21/2021    LDLCALC 77 09/21/2021    LABVLDL 52 09/21/2021     TSH:    Lab Results   Component Value Date    TSH 3.050 09/21/2021        Assessment and Plan:  Tiff Salinas was seen today for hypertension and sweats. Diagnoses and all orders for this visit:    Essential hypertension  -     lisinopril-hydroCHLOROthiazide (PRINZIDE;ZESTORETIC) 10-12.5 MG per tablet; Take 1 tablet by mouth daily  -     CBC Auto Differential; Future  -     Comprehensive Metabolic Panel; Future  -     Lipid Panel; Future  -     Vitamin B12 & Folate; Future  -     Urinalysis; Future  -     Uric Acid; Future  Poorly controlled and continues to worsen.   Discussed diet and exercise- patient already down weight from last visit with BP going up. Will add ACEi to HCTZ for better control. Labs reviewed in detail. Repeat in 6 months     Acquired hypothyroidism  -     TSH without Reflex; Future  -     T4, Free; Future  Increase to 1 tab daily with 2 tabs on Sunday to hopefully help hot flashes and diarrhea more. Irritable bowel syndrome with diarrhea  -     dicyclomine (BENTYL) 10 MG capsule; Take 1 capsule by mouth 4 times daily  -     famotidine (PEPCID) 20 MG tablet; Take 1 tablet by mouth 2 times daily    Mixed hyperlipidemia  -     Lipid Panel; Future    Impaired fasting glucose  -     Hemoglobin A1C; Future    Vitamin D insufficiency  -     Vitamin D 25 Hydroxy; Future    Need for influenza vaccination  -     INFLUENZA, QUADV, ADJUVANTED, 65 YRS =, IM, PF, PREFILL SYR, 0.5ML (FLUAD)        Return in about 6 months (around 3/28/2022), or if symptoms worsen or fail to improve, for AWV.       Seen By:  Francy Roach, DO

## 2021-10-02 ENCOUNTER — OFFICE VISIT (OUTPATIENT)
Dept: FAMILY MEDICINE CLINIC | Age: 71
End: 2021-10-02
Payer: MEDICARE

## 2021-10-02 VITALS
WEIGHT: 138 LBS | HEART RATE: 69 BPM | OXYGEN SATURATION: 97 % | SYSTOLIC BLOOD PRESSURE: 146 MMHG | TEMPERATURE: 97.8 F | DIASTOLIC BLOOD PRESSURE: 84 MMHG | BODY MASS INDEX: 27.87 KG/M2

## 2021-10-02 DIAGNOSIS — Z20.822 SUSPECTED COVID-19 VIRUS INFECTION: ICD-10-CM

## 2021-10-02 DIAGNOSIS — H66.001 NON-RECURRENT ACUTE SUPPURATIVE OTITIS MEDIA OF RIGHT EAR WITHOUT SPONTANEOUS RUPTURE OF TYMPANIC MEMBRANE: Primary | ICD-10-CM

## 2021-10-02 PROCEDURE — 99213 OFFICE O/P EST LOW 20 MIN: CPT | Performed by: FAMILY MEDICINE

## 2021-10-02 RX ORDER — FLUTICASONE PROPIONATE 50 MCG
2 SPRAY, SUSPENSION (ML) NASAL DAILY
Qty: 16 G | Refills: 5 | Status: SHIPPED
Start: 2021-10-02 | End: 2022-09-25

## 2021-10-02 RX ORDER — AZITHROMYCIN 250 MG/1
250 TABLET, FILM COATED ORAL SEE ADMIN INSTRUCTIONS
Qty: 6 TABLET | Refills: 0 | Status: SHIPPED | OUTPATIENT
Start: 2021-10-02 | End: 2021-10-07

## 2021-10-02 ASSESSMENT — ENCOUNTER SYMPTOMS
VOMITING: 0
DIARRHEA: 0
SINUS PAIN: 0
TROUBLE SWALLOWING: 0
SORE THROAT: 0
CHEST TIGHTNESS: 0
NAUSEA: 0
BACK PAIN: 0
COUGH: 1
SHORTNESS OF BREATH: 0
EYE PAIN: 0
CONSTIPATION: 0
WHEEZING: 0
RHINORRHEA: 1
ABDOMINAL PAIN: 0

## 2021-10-02 NOTE — PROGRESS NOTES
Arielle Henriquez (:  1950) is a 66 y.o. female,Established patient, here for evaluation of the following chief complaint(s):  Cough (Started two days ago. ), Pharyngitis, Nasal Congestion, and Otalgia         ASSESSMENT/PLAN:  1. Non-recurrent acute suppurative otitis media of right ear without spontaneous rupture of tympanic membrane  Comments:  zpack and flonase  covid swabbed they have had 3 shots  f/u as needed  Orders:  -     azithromycin (ZITHROMAX) 250 MG tablet; Take 1 tablet by mouth See Admin Instructions for 5 days 500mg on day 1 followed by 250mg on days 2 - 5, Disp-6 tablet, R-0Normal  2. Suspected COVID-19 virus infection  -     COVID-19 Ambulatory; Future      Return if symptoms worsen or fail to improve. Subjective   SUBJECTIVE/OBJECTIVE:  Patient here with right ear pressure, cough and drianage  Also scratchy throat   is heart transplant patient an d he was swabbed Thursday for covid but his test is not back yet    No fevers  No GI s/s  Not really any headaches  Right ear pain  Has allergies and has  Not really been taking anything for them      Review of Systems   Constitutional: Negative for appetite change, fatigue and fever. HENT: Positive for congestion, ear pain, postnasal drip and rhinorrhea. Negative for sinus pain, sore throat and trouble swallowing. Eyes: Negative for pain. Respiratory: Positive for cough. Negative for chest tightness, shortness of breath and wheezing. Cardiovascular: Negative for chest pain, palpitations and leg swelling. Gastrointestinal: Negative for abdominal pain, constipation, diarrhea, nausea and vomiting. Endocrine: Negative for cold intolerance and heat intolerance. Genitourinary: Negative for difficulty urinating, hematuria and pelvic pain. Musculoskeletal: Negative for back pain, gait problem and joint swelling. Skin: Negative for rash and wound. Neurological: Negative for dizziness, syncope and headaches. Hematological: Negative for adenopathy. Psychiatric/Behavioral: Negative for confusion, sleep disturbance and suicidal ideas. Objective   Physical Exam  Vitals and nursing note reviewed. Constitutional:       General: She is not in acute distress. Appearance: Normal appearance. She is well-developed. She is not ill-appearing. HENT:      Head: Normocephalic and atraumatic. Left Ear: Tympanic membrane normal.      Ears:      Comments: Right TM bulging with fluid and slightly injected     Nose: Congestion present. Mouth/Throat:      Mouth: Mucous membranes are moist.   Eyes:      Pupils: Pupils are equal, round, and reactive to light. Cardiovascular:      Rate and Rhythm: Normal rate and regular rhythm. Pulmonary:      Effort: Pulmonary effort is normal.      Breath sounds: Normal breath sounds. No wheezing or rhonchi. Abdominal:      General: Bowel sounds are normal.      Palpations: Abdomen is soft. Musculoskeletal:      Cervical back: Normal range of motion. Skin:     General: Skin is warm and dry. Neurological:      Mental Status: She is alert and oriented to person, place, and time. Mental status is at baseline. Psychiatric:         Mood and Affect: Mood normal.         Thought Content: Thought content normal.                  An electronic signature was used to authenticate this note.     --Antonette Flores, DO

## 2021-12-29 ENCOUNTER — OFFICE VISIT (OUTPATIENT)
Dept: FAMILY MEDICINE CLINIC | Age: 71
End: 2021-12-29
Payer: MEDICARE

## 2021-12-29 VITALS
OXYGEN SATURATION: 98 % | BODY MASS INDEX: 28.22 KG/M2 | RESPIRATION RATE: 18 BRPM | HEART RATE: 91 BPM | HEIGHT: 59 IN | TEMPERATURE: 97.2 F | WEIGHT: 140 LBS

## 2021-12-29 DIAGNOSIS — J01.90 ACUTE BACTERIAL SINUSITIS: ICD-10-CM

## 2021-12-29 DIAGNOSIS — Z20.822 CLOSE EXPOSURE TO COVID-19 VIRUS: Primary | ICD-10-CM

## 2021-12-29 DIAGNOSIS — B96.89 ACUTE BACTERIAL SINUSITIS: ICD-10-CM

## 2021-12-29 DIAGNOSIS — Z20.822 CLOSE EXPOSURE TO COVID-19 VIRUS: ICD-10-CM

## 2021-12-29 LAB
Lab: NORMAL
PERFORMING INSTRUMENT: NORMAL
QC PASS/FAIL: NORMAL
SARS-COV-2, POC: NORMAL

## 2021-12-29 PROCEDURE — 87426 SARSCOV CORONAVIRUS AG IA: CPT | Performed by: FAMILY MEDICINE

## 2021-12-29 PROCEDURE — 99213 OFFICE O/P EST LOW 20 MIN: CPT | Performed by: FAMILY MEDICINE

## 2021-12-29 RX ORDER — AZITHROMYCIN 250 MG/1
TABLET, FILM COATED ORAL
Qty: 6 TABLET | Refills: 0 | Status: SHIPPED
Start: 2021-12-29 | End: 2022-03-29

## 2021-12-29 ASSESSMENT — ENCOUNTER SYMPTOMS
SINUS COMPLAINT: 1
COLOR CHANGE: 0
TROUBLE SWALLOWING: 0
ALLERGIC/IMMUNOLOGIC NEGATIVE: 1
SHORTNESS OF BREATH: 0
SINUS PRESSURE: 1
BACK PAIN: 0
DIARRHEA: 0
COUGH: 0
ABDOMINAL PAIN: 0
RESPIRATORY NEGATIVE: 1
VOMITING: 0
APNEA: 0
SORE THROAT: 0
NAUSEA: 0
EYES NEGATIVE: 1
BLOOD IN STOOL: 0
RHINORRHEA: 1
PHOTOPHOBIA: 0
CHEST TIGHTNESS: 0
FACIAL SWELLING: 0
WHEEZING: 0

## 2021-12-29 NOTE — PROGRESS NOTES
Chief Complaint:   Chief Complaint   Patient presents with    Pharyngitis    Congestion       Sinus Problem  This is a new problem. The current episode started in the past 7 days. The problem has been gradually worsening since onset. There has been no fever. Associated symptoms include congestion, headaches and sinus pressure. Pertinent negatives include no coughing, ear pain, shortness of breath or sore throat.        Patient Active Problem List   Diagnosis    Essential hypertension    Radiculitis of left cervical region    Acquired hypothyroidism    Irritable bowel syndrome with diarrhea       Past Medical History:   Diagnosis Date    Acquired hypothyroidism 2017    Cataract, left eye     Essential hypertension 2017    Hyperlipidemia     Hypertension     Lumbar spinal stenosis     Radiculitis of left cervical region 2017    Thyroid disease        Past Surgical History:   Procedure Laterality Date    CATARACT REMOVAL WITH IMPLANT Right 2016     SECTION      x 2    COLONOSCOPY      INTRACAPSULAR CATARACT EXTRACTION Left 2019    LEFT EYE CATARACT EXTRACTION WITH  IOL IMPLANT performed by Elton Kuhn MD at [facility]         Current Outpatient Medications   Medication Sig Dispense Refill    azithromycin (ZITHROMAX Z-STU) 250 MG tablet Use as directed 6 tablet 0    fluticasone (FLONASE) 50 MCG/ACT nasal spray 2 sprays by Each Nostril route daily 16 g 5    Estradiol (VAGIFEM) 10 MCG TABS vaginal tablet INSERT ONE TABLET in the VAGINA AT BEDTIME (nightly) for 2 weeks  then twice a week thereafter      ezetimibe (ZETIA) 10 MG tablet Take 10 mg by mouth three times a week      lisinopril-hydroCHLOROthiazide (PRINZIDE;ZESTORETIC) 10-12.5 MG per tablet Take 1 tablet by mouth daily 90 tablet 1    dicyclomine (BENTYL) 10 MG capsule Take 1 capsule by mouth 4 times daily 360 capsule 1    famotidine (PEPCID) 20 MG tablet Take 1 tablet by mouth 2 times daily 180 tablet 1    atorvastatin (LIPITOR) 10 MG tablet TAKE ONE TABLET BY MOUTH 3 TIMES A WEEK ON MONDAY, WEDNESDAY AND FRIDAY 36 tablet 1    dilTIAZem (DILACOR XR) 240 MG extended release capsule Take 1 capsule by mouth daily 90 capsule 1    levothyroxine (SYNTHROID) 100 MCG tablet TAKE ONE TABLET BY MOUTH DAILY 90 tablet 1    meclizine (ANTIVERT) 25 MG tablet Take 1 tablet by mouth 3 times daily as needed for Dizziness 270 tablet 0    Calcium Carbonate-Vit D-Min (CALCIUM 1200) 6589-4347 MG-UNIT CHEW       Cholecalciferol (VITAMIN D3) 1.25 MG (09251 UT) CAPS Take by mouth      Triamcinolone Acetonide (NASACORT ALLERGY 24HR NA) by Nasal route      loratadine (CLARITIN) 10 MG tablet Take 10 mg by mouth daily      aspirin 81 MG tablet Take 81 mg by mouth daily Ld 3/21/2019 per dr. Sharmaine Pugh       No current facility-administered medications for this visit. No Known Allergies    Social History     Socioeconomic History    Marital status:      Spouse name: None    Number of children: None    Years of education: None    Highest education level: None   Occupational History    None   Tobacco Use    Smoking status: Former Smoker     Packs/day: 0.50     Years: 40.00     Pack years: 20.00     Start date: 25     Quit date: 1/26/2011     Years since quitting: 10.9    Smokeless tobacco: Never Used   Vaping Use    Vaping Use: Never used   Substance and Sexual Activity    Alcohol use: Yes     Comment: weekends / 2 glasses wine    Drug use: No    Sexual activity: None   Other Topics Concern    None   Social History Narrative    None     Social Determinants of Health     Financial Resource Strain:     Difficulty of Paying Living Expenses: Not on file   Food Insecurity:     Worried About Running Out of Food in the Last Year: Not on file    Olaf of Food in the Last Year: Not on file   Transportation Needs:     Lack of Transportation (Medical):  Not on file    Lack of Transportation (Non-Medical): Not on file   Physical Activity:     Days of Exercise per Week: Not on file    Minutes of Exercise per Session: Not on file   Stress:     Feeling of Stress : Not on file   Social Connections:     Frequency of Communication with Friends and Family: Not on file    Frequency of Social Gatherings with Friends and Family: Not on file    Attends [employer] Services: Not on file    Active Member of [address] [place] [address] or Organizations: Not on file    Attends Club or Organization Meetings: Not on file    Marital Status: Not on file   Intimate Partner Violence:     Fear of Current or Ex-Partner: Not on file    Emotionally Abused: Not on file    Physically Abused: Not on file    Sexually Abused: Not on file   Housing Stability:     Unable to Pay for Housing in the Last Year: Not on file    Number of [place] in the Last Year: Not on file    Unstable Housing in the Last Year: Not on file       Family History   Problem Relation Age of Onset    Heart Disease Mother     Other Father         sepsis from TKA    COPD Father     Hypothyroidism Father     Heart Attack Maternal Grandmother     Heart Attack Maternal Grandfather     Cancer Maternal Aunt         Hodgkin lymphoma    No Known Problems Brother          Review of Systems   Constitutional: Negative. HENT: Positive for congestion, postnasal drip, rhinorrhea and sinus pressure. Negative for ear pain, facial swelling, hearing loss, nosebleeds, sore throat, tinnitus and trouble swallowing. Eyes: Negative. Negative for photophobia and visual disturbance. Respiratory: Negative. Negative for apnea, cough, chest tightness, shortness of breath and wheezing. Cardiovascular: Negative for chest pain, palpitations and leg swelling. Gastrointestinal: Negative for abdominal pain, blood in stool, diarrhea, nausea and vomiting. Genitourinary: Negative for difficulty urinating, frequency and urgency.    Musculoskeletal: Negative for arthralgias, back pain, joint swelling and myalgias. Skin: Negative for color change and rash. Allergic/Immunologic: Negative. Neurological: Positive for headaches. Negative for syncope, weakness and light-headedness. Hematological: Negative. Psychiatric/Behavioral: Negative for agitation, behavioral problems, confusion and self-injury. The patient is not nervous/anxious. All other systems reviewed and are negative. Physical Exam  Constitutional:       Appearance: She is well-developed. HENT:      Head: Atraumatic. Nose: Rhinorrhea present. Right Sinus: Maxillary sinus tenderness present. Left Sinus: Maxillary sinus tenderness present. Mouth/Throat:      Pharynx: Uvula midline. Posterior oropharyngeal erythema present. Eyes:      General:         Right eye: No discharge. Left eye: No discharge. Cardiovascular:      Rate and Rhythm: Normal rate and regular rhythm. Heart sounds: Normal heart sounds. Pulmonary:      Effort: Pulmonary effort is normal. No respiratory distress. Breath sounds: Normal breath sounds. No stridor. No wheezing or rales. Chest:      Chest wall: No tenderness. Abdominal:      General: Bowel sounds are normal. There is no distension. Palpations: Abdomen is soft. There is no mass. Tenderness: There is no abdominal tenderness. There is no guarding or rebound. Musculoskeletal:         General: Normal range of motion. Cervical back: Normal range of motion and neck supple. Skin:     General: Skin is warm and dry. Coloration: Skin is not pale. Findings: No erythema or rash. Neurological:      Mental Status: She is alert and oriented to person, place, and time. Deep Tendon Reflexes: Reflexes are normal and symmetric. Psychiatric:         Judgment: Judgment normal.                               ASSESSMENT/PLAN:    Christiano Benjamin was seen today for pharyngitis and congestion.     Diagnoses and all orders for this visit:    Close exposure to COVID-19 virus  -     POCT COVID-19, Antigen  -     COVID-19 Ambulatory;  Future    Acute bacterial sinusitis  -     azithromycin (ZITHROMAX Z-STU) 250 MG tablet; Use as directed            Trace Lyon DO    12/29/2021  12:27 PM

## 2021-12-31 LAB
SARS-COV-2: NOT DETECTED
SOURCE: NORMAL

## 2022-01-07 DIAGNOSIS — E03.9 ACQUIRED HYPOTHYROIDISM: ICD-10-CM

## 2022-01-07 RX ORDER — LEVOTHYROXINE SODIUM 0.1 MG/1
TABLET ORAL
Qty: 90 TABLET | Refills: 1 | Status: SHIPPED
Start: 2022-01-07 | End: 2022-06-14

## 2022-01-29 DIAGNOSIS — F51.01 PRIMARY INSOMNIA: ICD-10-CM

## 2022-01-29 DIAGNOSIS — E78.2 MIXED HYPERLIPIDEMIA: ICD-10-CM

## 2022-01-30 RX ORDER — ATORVASTATIN CALCIUM 10 MG/1
TABLET, FILM COATED ORAL
Qty: 36 TABLET | Refills: 0 | Status: SHIPPED
Start: 2022-01-30 | End: 2022-05-09

## 2022-01-30 RX ORDER — TEMAZEPAM 15 MG/1
CAPSULE ORAL
Qty: 90 CAPSULE | Refills: 0 | Status: SHIPPED | OUTPATIENT
Start: 2022-01-30 | End: 2022-04-30

## 2022-02-05 DIAGNOSIS — I10 ESSENTIAL HYPERTENSION: ICD-10-CM

## 2022-02-05 RX ORDER — DILTIAZEM HYDROCHLORIDE 240 MG/1
CAPSULE, EXTENDED RELEASE ORAL
Qty: 90 CAPSULE | Refills: 0 | Status: SHIPPED
Start: 2022-02-05 | End: 2022-05-09

## 2022-03-20 ENCOUNTER — OFFICE VISIT (OUTPATIENT)
Dept: FAMILY MEDICINE CLINIC | Age: 72
End: 2022-03-20
Payer: MEDICARE

## 2022-03-20 VITALS
HEART RATE: 76 BPM | HEIGHT: 59 IN | SYSTOLIC BLOOD PRESSURE: 160 MMHG | DIASTOLIC BLOOD PRESSURE: 72 MMHG | BODY MASS INDEX: 26.41 KG/M2 | WEIGHT: 131 LBS | OXYGEN SATURATION: 99 % | RESPIRATION RATE: 18 BRPM | TEMPERATURE: 98.3 F

## 2022-03-20 DIAGNOSIS — J01.10 ACUTE NON-RECURRENT FRONTAL SINUSITIS: Primary | ICD-10-CM

## 2022-03-20 DIAGNOSIS — I10 ESSENTIAL HYPERTENSION: ICD-10-CM

## 2022-03-20 PROCEDURE — 99213 OFFICE O/P EST LOW 20 MIN: CPT | Performed by: NURSE PRACTITIONER

## 2022-03-20 RX ORDER — AMOXICILLIN 875 MG/1
875 TABLET, COATED ORAL 2 TIMES DAILY
Qty: 20 TABLET | Refills: 0 | Status: SHIPPED | OUTPATIENT
Start: 2022-03-20 | End: 2022-03-30

## 2022-03-20 NOTE — PROGRESS NOTES
3/20/22  [facility] : 1950 Sex: female  Age 66 y.o. Subjective:  Chief Complaint   Patient presents with    Cough     x 1 week    Sinusitis     x 2-3 days    Nasal Congestion       HPI:   Brandy Coleman , 66 y.o. female presents to the clinic for evaluation of cough x 7 days. The patient also reports chronic rhinitis with increased sinus congestion / pressure x 3 days. The patient has taken Claritin for allergy symptoms. The patient reports unchanged symptoms over time. The patient denies known ill exposure. The patient denies hx of COVID-19 and reports having the vaccines and booster. The patient denies acute loss of taste and smell, sore throat, rash, and fever. The patient also denies chest pain, abdominal pain, shortness of breath, and nausea / vomiting / diarrhea. ROS:   Unless otherwise stated in this report the patient's positive and negative responses for review of systems for constitutional, eyes, ENT, cardiovascular, respiratory, gastrointestinal, neurological, , musculoskeletal, and integument systems and related systems to the presenting problem are either stated in the history of present illness or were not pertinent or were negative for the symptoms and/or complaints related to the presenting medical problem. Positives and pertinent negatives as per HPI. All others reviewed and are negative.       PMH:     Past Medical History:   Diagnosis Date    Acquired hypothyroidism 2017    Cataract, left eye     Essential hypertension 2017    Hyperlipidemia     Hypertension     Lumbar spinal stenosis     Radiculitis of left cervical region 2017    Thyroid disease        Past Surgical History:   Procedure Laterality Date    CATARACT REMOVAL WITH IMPLANT Right 2016     SECTION      x 2    COLONOSCOPY      INTRACAPSULAR CATARACT EXTRACTION Left 2019    LEFT EYE CATARACT EXTRACTION WITH  IOL IMPLANT performed [facility] Opal [facility]eng [facility]         Family History   Problem Relation Age of Onset    Heart Disease Mother     Other Father         sepsis from TKA    COPD Father     Hypothyroidism Father     Heart Attack Maternal Grandmother     Heart Attack Maternal Grandfather     Cancer Maternal Aunt         Hodgkin lymphoma    No Known Problems Brother        Medications:     Current Outpatient Medications:     amoxicillin (AMOXIL) 875 MG tablet, Take 1 tablet by mouth 2 times daily for 10 days, Disp: 20 tablet, Rfl: 0    DILT- MG extended release capsule, TAKE ONE CAPSULE BY MOUTH DAILY, Disp: 90 capsule, Rfl: 0    temazepam (RESTORIL) 15 MG capsule, TAKE 1 CAPSULE BY MOUTH NIGHTLY AS NEEDED FOR SLEEP FOR UP TO 90 DAYS, Disp: 90 capsule, Rfl: 0    atorvastatin (LIPITOR) 10 MG tablet, TAKE ONE TABLET BY MOUTH THREE TIMES a week on monday, wednesday & friday, Disp: 36 tablet, Rfl: 0    levothyroxine (SYNTHROID) 100 MCG tablet, TAKE ONE TABLET BY MOUTH DAILY, Disp: 90 tablet, Rfl: 1    fluticasone (FLONASE) 50 MCG/ACT nasal spray, 2 sprays by Each Nostril route daily, Disp: 16 g, Rfl: 5    Estradiol (VAGIFEM) 10 MCG TABS vaginal tablet, INSERT ONE TABLET in the VAGINA AT BEDTIME (nightly) for 2 weeks  then twice a week thereafter, Disp: , Rfl:     ezetimibe (ZETIA) 10 MG tablet, Take 10 mg by mouth three times a week, Disp: , Rfl:     lisinopril-hydroCHLOROthiazide (PRINZIDE;ZESTORETIC) 10-12.5 MG per tablet, Take 1 tablet by mouth daily, Disp: 90 tablet, Rfl: 1    dicyclomine (BENTYL) 10 MG capsule, Take 1 capsule by mouth 4 times daily, Disp: 360 capsule, Rfl: 1    famotidine (PEPCID) 20 MG tablet, Take 1 tablet by mouth 2 times daily, Disp: 180 tablet, Rfl: 1    meclizine (ANTIVERT) 25 MG tablet, Take 1 tablet by mouth 3 times daily as needed for Dizziness, Disp: 270 tablet, Rfl: 0    Calcium Carbonate-Vit D-Min (CALCIUM 1200) 7926-1735 MG-UNIT CHEW, , Disp: , Rfl:     Cholecalciferol Palpations: Abdomen is soft. Musculoskeletal:         General: Normal range of motion. Cervical back: Normal range of motion and neck supple. Lymphadenopathy:      Cervical: No cervical adenopathy. Skin:     General: Skin is warm and dry. Capillary Refill: Capillary refill takes less than 2 seconds. Neurological:      General: No focal deficit present. Mental Status: She is alert and oriented to person, place, and time. Psychiatric:         Mood and Affect: Mood normal.         Behavior: Behavior normal.          Testing:   (All laboratory and radiology results have been personally reviewed by myself)  Labs:  No results found for this visit on 03/20/22. Imaging: All Radiology results interpreted by Radiologist unless otherwise noted. No orders to display       Assessment / Plan:   The patient's vitals, allergies, medications, and past medical history have been reviewed. Ruth Ann Rojas was seen today for cough, sinusitis and nasal congestion. Diagnoses and all orders for this visit:    Acute non-recurrent frontal sinusitis  -     amoxicillin (AMOXIL) 875 MG tablet; Take 1 tablet by mouth 2 times daily for 10 days        - Disposition: Home    - Educational material printed for patient's review and were included in patient instructions. After Visit Summary was given to patient at the end of visit. - COVID-19 test declined today. Advised to follow CDC guidelines. Encouraged oral fluids and rest. Discussed symptomatic treatments with patient today including Claritin / Flonase prn for rhinitis and Tylenol prn for fever / pain. Schedule a follow-up with PCP in 2-3 days. Red flag symptoms were discussed with the patient today. The patient is directed to go the ED if symptoms change or worsen. Pt verbalizes understanding and is in agreement with plan of care. All questions answered. SIGNATURE: HARSHAL Quintanilla    *NOTE: This report was transcribed using voice recognition software. Every effort was made to ensure accuracy; however, inadvertent computerized transcription errors may be present.

## 2022-03-20 NOTE — PATIENT INSTRUCTIONS
Patient Education        Sinusitis: Care Instructions  Your Care Instructions     Sinusitis is an infection of the lining of the sinus cavities in your head. Sinusitis often follows a cold. It causes pain and pressure in your head and face. In most cases, sinusitis gets better on its own in 1 to 2 weeks. But some mild symptoms may last for several weeks. Sometimes antibiotics are needed. Follow-up care is a key part of your treatment and safety. Be sure to make and go to all appointments, and call your doctor if you are having problems. It's also a good idea to know your test results and keep a list of the medicines you take. How can you care for yourself at home? · Take an over-the-counter pain medicine, such as acetaminophen (Tylenol), ibuprofen (Advil, Motrin), or naproxen (Aleve). Read and follow all instructions on the label. · If the doctor prescribed antibiotics, take them as directed. Do not stop taking them just because you feel better. You need to take the full course of antibiotics. · Be careful when taking over-the-counter cold or flu medicines and Tylenol at the same time. Many of these medicines have acetaminophen, which is Tylenol. Read the labels to make sure that you are not taking more than the recommended dose. Too much acetaminophen (Tylenol) can be harmful. · Breathe warm, moist air from a steamy shower, a hot bath, or a sink filled with hot water. Avoid cold, dry air. Using a humidifier in your home may help. Follow the directions for cleaning the machine. · Use saline (saltwater) nasal washes. This can help keep your nasal passages open and wash out mucus and bacteria. You can buy saline nose drops at a grocery store or drugstore. Or you can make your own at home by adding 1 teaspoon of salt and 1 teaspoon of baking soda to 2 cups of distilled water. If you make your own, fill a bulb syringe with the solution, insert the tip into your nostril, and squeeze gently.  Dann Drake your nose.  · Put a hot, wet towel or a warm gel pack on your face 3 or 4 times a day for 5 to 10 minutes each time. · Try a decongestant nasal spray like oxymetazoline (Afrin). Do not use it for more than 3 days in a row. Using it for more than 3 days can make your congestion worse. When should you call for help? Call your doctor now or seek immediate medical care if:    · You have new or worse swelling or redness in your face or around your eyes.     · You have a new or higher fever. Watch closely for changes in your health, and be sure to contact your doctor if:    · You have new or worse facial pain.     · The mucus from your nose becomes thicker (like pus) or has new blood in it.     · You are not getting better as expected. Where can you learn more? Go to https://[employer]MYagonism.com. org and sign in to your [employer] account. Enter L140 in the [employer] box to learn more about \"Sinusitis: Care Instructions. \"     If you do not have an account, please click on the \"Sign Up Now\" link. Current as of: September 8, 2021               Content Version: 13.1  © 7847-9041 [employer]. Care instructions adapted under license by [facility] ([facility]). If you have questions about a medical condition or this instruction, always ask your healthcare professional. [address] any warranty or liability for your use of this information.

## 2022-03-21 RX ORDER — LISINOPRIL AND HYDROCHLOROTHIAZIDE 12.5; 1 MG/1; MG/1
TABLET ORAL
Qty: 90 TABLET | Refills: 1 | Status: SHIPPED
Start: 2022-03-21 | End: 2022-03-29 | Stop reason: DRUGHIGH

## 2022-03-21 RX ORDER — EZETIMIBE 10 MG/1
TABLET ORAL
Qty: 36 TABLET | Refills: 1 | Status: SHIPPED
Start: 2022-03-21 | End: 2022-09-19

## 2022-03-22 ENCOUNTER — HOSPITAL ENCOUNTER (OUTPATIENT)
Age: 72
Discharge: HOME | End: 2022-03-22
Payer: MEDICARE

## 2022-03-22 DIAGNOSIS — R73.01 IMPAIRED FASTING GLUCOSE: ICD-10-CM

## 2022-03-22 DIAGNOSIS — E55.9 VITAMIN D INSUFFICIENCY: ICD-10-CM

## 2022-03-22 DIAGNOSIS — E03.9 ACQUIRED HYPOTHYROIDISM: ICD-10-CM

## 2022-03-22 DIAGNOSIS — I10 ESSENTIAL HYPERTENSION: ICD-10-CM

## 2022-03-22 DIAGNOSIS — E78.2 MIXED HYPERLIPIDEMIA: ICD-10-CM

## 2022-03-22 LAB
ALBUMIN SERPL-MCNC: 4.4 G/DL (ref 3.5–5.2)
ALP BLD-CCNC: 72 U/L (ref 35–104)
ALT SERPL-CCNC: 19 U/L (ref 0–32)
ANION GAP SERPL CALCULATED.3IONS-SCNC: 12 MMOL/L (ref 7–16)
AST SERPL-CCNC: 17 U/L (ref 0–31)
BACTERIA: ABNORMAL /HPF
BASOPHILS ABSOLUTE: 0.03 E9/L (ref 0–0.2)
BASOPHILS RELATIVE PERCENT: 0.4 % (ref 0–2)
BILIRUB SERPL-MCNC: 0.3 MG/DL (ref 0–1.2)
BILIRUBIN URINE: NEGATIVE
BLOOD, URINE: NEGATIVE
BUN BLDV-MCNC: 22 MG/DL (ref 6–23)
CALCIUM SERPL-MCNC: 9.2 MG/DL (ref 8.6–10.2)
CHLORIDE BLD-SCNC: 108 MMOL/L (ref 98–107)
CHOLESTEROL, TOTAL: 192 MG/DL (ref 0–199)
CLARITY: CLEAR
CO2: 22 MMOL/L (ref 22–29)
COLOR: YELLOW
CREAT SERPL-MCNC: 0.9 MG/DL (ref 0.5–1)
EOSINOPHILS ABSOLUTE: 0.08 E9/L (ref 0.05–0.5)
EOSINOPHILS RELATIVE PERCENT: 1.1 % (ref 0–6)
EPITHELIAL CELLS, UA: ABNORMAL /HPF
FOLATE: >20 NG/ML (ref 4.8–24.2)
GFR AFRICAN AMERICAN: >60
GFR NON-AFRICAN AMERICAN: >60 ML/MIN/1.73
GLUCOSE BLD-MCNC: 105 MG/DL (ref 74–99)
GLUCOSE URINE: NEGATIVE MG/DL
HBA1C MFR BLD: 5.3 % (ref 4–5.6)
HCT VFR BLD CALC: 43.8 % (ref 34–48)
HDLC SERPL-MCNC: 62 MG/DL
HEMOGLOBIN: 14.3 G/DL (ref 11.5–15.5)
IMMATURE GRANULOCYTES #: 0.02 E9/L
IMMATURE GRANULOCYTES %: 0.3 % (ref 0–5)
KETONES, URINE: NEGATIVE MG/DL
LDL CHOLESTEROL CALCULATED: 95 MG/DL (ref 0–99)
LEUKOCYTE ESTERASE, URINE: ABNORMAL
LYMPHOCYTES ABSOLUTE: 3.35 E9/L (ref 1.5–4)
LYMPHOCYTES RELATIVE PERCENT: 46 % (ref 20–42)
MCH RBC QN AUTO: 31.3 PG (ref 26–35)
MCHC RBC AUTO-ENTMCNC: 32.6 % (ref 32–34.5)
MCV RBC AUTO: 95.8 FL (ref 80–99.9)
MONOCYTES ABSOLUTE: 0.47 E9/L (ref 0.1–0.95)
MONOCYTES RELATIVE PERCENT: 6.5 % (ref 2–12)
NEUTROPHILS ABSOLUTE: 3.33 E9/L (ref 1.8–7.3)
NEUTROPHILS RELATIVE PERCENT: 45.7 % (ref 43–80)
NITRITE, URINE: NEGATIVE
PDW BLD-RTO: 12.5 FL (ref 11.5–15)
PH UA: 5.5 (ref 5–9)
PLATELET # BLD: 203 E9/L (ref 130–450)
PMV BLD AUTO: 11 FL (ref 7–12)
POTASSIUM SERPL-SCNC: 4.3 MMOL/L (ref 3.5–5)
PROTEIN UA: NEGATIVE MG/DL
RBC # BLD: 4.57 E12/L (ref 3.5–5.5)
RBC UA: ABNORMAL /HPF (ref 0–2)
SODIUM BLD-SCNC: 142 MMOL/L (ref 132–146)
SPECIFIC GRAVITY UA: 1.01 (ref 1–1.03)
T4 FREE: 1.38 NG/DL (ref 0.93–1.7)
TOTAL PROTEIN: 7 G/DL (ref 6.4–8.3)
TRIGL SERPL-MCNC: 176 MG/DL (ref 0–149)
TSH SERPL DL<=0.05 MIU/L-ACNC: 0.76 UIU/ML (ref 0.27–4.2)
URIC ACID, SERUM: 6.3 MG/DL (ref 2.4–5.7)
UROBILINOGEN, URINE: 0.2 E.U./DL
VITAMIN B-12: 598 PG/ML (ref 211–946)
VITAMIN D 25-HYDROXY: 48 NG/ML (ref 30–100)
VLDLC SERPL CALC-MCNC: 35 MG/DL
WBC # BLD: 7.3 E9/L (ref 4.5–11.5)
WBC UA: ABNORMAL /HPF (ref 0–5)

## 2022-03-22 PROCEDURE — 80053 COMPREHEN METABOLIC PANEL: CPT

## 2022-03-22 PROCEDURE — 36415 COLL VENOUS BLD VENIPUNCTURE: CPT

## 2022-03-22 PROCEDURE — 83036 HEMOGLOBIN GLYCOSYLATED A1C: CPT

## 2022-03-22 PROCEDURE — 84439 ASSAY OF FREE THYROXINE: CPT

## 2022-03-22 PROCEDURE — 82607 VITAMIN B-12: CPT

## 2022-03-22 PROCEDURE — 82746 ASSAY OF FOLIC ACID SERUM: CPT

## 2022-03-22 PROCEDURE — 85025 COMPLETE CBC W/AUTO DIFF WBC: CPT

## 2022-03-22 PROCEDURE — 82306 VITAMIN D 25 HYDROXY: CPT

## 2022-03-22 PROCEDURE — 84443 ASSAY THYROID STIM HORMONE: CPT

## 2022-03-22 PROCEDURE — 81001 URINALYSIS AUTO W/SCOPE: CPT

## 2022-03-22 PROCEDURE — 84550 ASSAY OF BLOOD/URIC ACID: CPT

## 2022-03-22 PROCEDURE — 80061 LIPID PANEL: CPT

## 2022-03-29 ENCOUNTER — OFFICE VISIT (OUTPATIENT)
Dept: PRIMARY CARE CLINIC | Age: 72
End: 2022-03-29
Payer: MEDICARE

## 2022-03-29 VITALS
OXYGEN SATURATION: 98 % | SYSTOLIC BLOOD PRESSURE: 166 MMHG | HEIGHT: 59 IN | DIASTOLIC BLOOD PRESSURE: 70 MMHG | TEMPERATURE: 97.2 F | WEIGHT: 137 LBS | BODY MASS INDEX: 27.62 KG/M2 | HEART RATE: 66 BPM

## 2022-03-29 DIAGNOSIS — I10 ESSENTIAL HYPERTENSION: ICD-10-CM

## 2022-03-29 DIAGNOSIS — Z00.00 MEDICARE ANNUAL WELLNESS VISIT, SUBSEQUENT: Primary | ICD-10-CM

## 2022-03-29 PROCEDURE — G0439 PPPS, SUBSEQ VISIT: HCPCS | Performed by: FAMILY MEDICINE

## 2022-03-29 RX ORDER — PROMETHAZINE HYDROCHLORIDE 25 MG/1
TABLET ORAL
COMMUNITY
Start: 2022-01-07 | End: 2022-09-25

## 2022-03-29 RX ORDER — LISINOPRIL AND HYDROCHLOROTHIAZIDE 25; 20 MG/1; MG/1
1 TABLET ORAL DAILY
Qty: 90 TABLET | Refills: 1 | Status: SHIPPED
Start: 2022-03-29 | End: 2022-10-17

## 2022-03-29 SDOH — ECONOMIC STABILITY: FOOD INSECURITY: WITHIN THE PAST 12 MONTHS, YOU WORRIED THAT YOUR FOOD WOULD RUN OUT BEFORE YOU GOT MONEY TO BUY MORE.: NEVER TRUE

## 2022-03-29 SDOH — ECONOMIC STABILITY: FOOD INSECURITY: WITHIN THE PAST 12 MONTHS, THE FOOD YOU BOUGHT JUST DIDN'T LAST AND YOU DIDN'T HAVE MONEY TO GET MORE.: NEVER TRUE

## 2022-03-29 ASSESSMENT — SOCIAL DETERMINANTS OF HEALTH (SDOH): HOW HARD IS IT FOR YOU TO PAY FOR THE VERY BASICS LIKE FOOD, HOUSING, MEDICAL CARE, AND HEATING?: NOT HARD AT ALL

## 2022-03-29 ASSESSMENT — LIFESTYLE VARIABLES
HOW OFTEN DO YOU HAVE A DRINK CONTAINING ALCOHOL: 2-4 TIMES A MONTH
HOW MANY STANDARD DRINKS CONTAINING ALCOHOL DO YOU HAVE ON A TYPICAL DAY: 1 OR 2

## 2022-03-29 ASSESSMENT — PATIENT HEALTH QUESTIONNAIRE - PHQ9
SUM OF ALL RESPONSES TO PHQ QUESTIONS 1-9: 1
1. LITTLE INTEREST OR PLEASURE IN DOING THINGS: 1
SUM OF ALL RESPONSES TO PHQ QUESTIONS 1-9: 1
2. FEELING DOWN, DEPRESSED OR HOPELESS: 0
SUM OF ALL RESPONSES TO PHQ9 QUESTIONS 1 & 2: 1
SUM OF ALL RESPONSES TO PHQ QUESTIONS 1-9: 1
SUM OF ALL RESPONSES TO PHQ QUESTIONS 1-9: 1

## 2022-03-29 NOTE — PROGRESS NOTES
Medicare Annual Wellness Visit    Brandy Coleman is here for Medicare AWV    [address] Harrell was seen today for medicare awv. Diagnoses and all orders for this visit:    Medicare annual wellness visit, subsequent  HRA reviewed and addressed. Due for mammo/DEXA in May- seeing GYN. Otherwise, UTD on HM. Fasting labs reviewed. Repeat in 6 months. Essential hypertension  -     lisinopril-hydroCHLOROthiazide (PRINZIDE;ZESTORETIC) 20-25 MG per tablet; Take 1 tablet by mouth daily  Poor control. Increase Prinzide and recheck. Recommendations for Preventive Services Due: see orders and patient instructions/AVS.  Recommended screening schedule for the next 5-10 years is provided to the patient in written form: see Patient Instructions/AVS.     Return in about 6 months (around 9/29/2022), or if symptoms worsen or fail to improve, for Chronic medical conditions. Subjective   The following acute and/or chronic problems were also addressed today:    Hypertension   The patient presents today for follow up of HTN. The problem is poorly controlled. Risk factors for coronary artery disease include Age > 27, HTN and family Hx. Current treatments include hydrochlorothiazide (HCTZ) and lisinopril (Prinivil). The patient is compliant all of the time. Lifestyle changes the patient has made include none. Today the patient is complaining of none. Patient has issues with chronic vertigo. Using Flonase, Claritin and Meclizine. Occurs only with weather change. Patient's complete Health Risk Assessment and screening values have been reviewed and are found in Flowsheets. The following problems were reviewed today and where indicated follow up appointments were made and/or referrals ordered.     Positive Risk Factor Screenings with Interventions:               General Health and ACP:  General  In general, how would you say your health is?: Fair  In the past 7 days, have you experienced any of the following: New or Increased Pain, New or Increased Fatigue, Loneliness, Social Isolation, Stress or Anger?: (!) Yes  Select all that apply: (!) Stress  Do you get the social and emotional support that you need?: (!) No  Do you have a Living Will?: Yes    Advance Directives     Power of Chip [place] Will ACP-Advance Directive ACP-Power of     Not on File Not on File Not on File Not on File      General Health Risk Interventions:  · Stress: patient declines any further evaluation/treatment for this issue  · Inadequate social/emotional support: patient declines any further intervention for this issue    Health Habits/Nutrition:     Physical Activity: Inactive    Days of Exercise per Week: 0 days    Minutes of Exercise per Session: 0 min     Have you lost any weight without trying in the past 3 months?: No  Body mass index: (!) 27.67  Have you seen the dentist within the past year?: Yes    Health Habits/Nutrition Interventions:  · Inadequate physical activity:  patient is not ready to increase his/her physical activity level at this time    Hearing/Vision:  Do you or your family notice any trouble with your hearing that hasn't been managed with hearing aids?: No  Do you have difficulty driving, watching TV, or doing any of your daily activities because of your eyesight?: No  Have you had an eye exam within the past year?: (!) No  No exam data present    Hearing/Vision Interventions:  · Vision concerns:  patient encouraged to make appointment with his/her eye specialist    Safety:  Do you have working smoke detectors?: Yes  Do you have any tripping hazards - loose or unsecured carpets or rugs?: (!) Yes  Do you have any tripping hazards - clutter in doorways, halls, or stairs?: No  Do you have either shower bars, grab bars, non-slip mats or non-slip surfaces in your shower or bathtub?: (!) No  Do all of your stairways have a railing or banister?: Yes  Do you always fasten your seatbelt when you are in a car?: Yes    Safety Interventions:  · Home safety tips provided           Objective   Vitals:    03/29/22 0857   BP: (!) 166/70   Pulse: 66   Temp: 97.2 °F (36.2 °C)   SpO2: 98%   Weight: 137 lb (62.1 kg)   Height: 4' 11\" (1.499 m)      Body mass index is 27.67 kg/m². Physical Exam  Vitals and nursing note reviewed. Constitutional:       General: She is not in acute distress. Appearance: Normal appearance. She is well-developed and normal weight. She is not ill-appearing. HENT:      Head: Normocephalic and atraumatic. Right Ear: Hearing, ear canal and external ear normal. A middle ear effusion (serous) is present. There is no impacted cerumen. Left Ear: Hearing, ear canal and external ear normal. A middle ear effusion (effusion) is present. There is no impacted cerumen. Nose: Congestion present. No mucosal edema or rhinorrhea. Right Sinus: No maxillary sinus tenderness or frontal sinus tenderness. Left Sinus: No maxillary sinus tenderness or frontal sinus tenderness. Comments: Wearing mask     Mouth/Throat:      Mouth: Mucous membranes are moist.      Pharynx: No oropharyngeal exudate or posterior oropharyngeal erythema. Eyes:      General: Lids are normal. No scleral icterus. Right eye: No discharge. Left eye: No discharge. Extraocular Movements: Extraocular movements intact. Conjunctiva/sclera: Conjunctivae normal.   Neck:      Thyroid: No thyromegaly. Vascular: No carotid bruit. Cardiovascular:      Rate and Rhythm: Normal rate and regular rhythm. Heart sounds: Murmur heard. Pulmonary:      Effort: Pulmonary effort is normal. No respiratory distress. Breath sounds: Normal breath sounds. No wheezing. Musculoskeletal:         General: No tenderness or deformity. Normal range of motion. Cervical back: Normal range of motion and neck supple. No tenderness. Right lower leg: No edema.       Left lower leg: No edema. Lymphadenopathy:      Cervical: No cervical adenopathy. Skin:     General: Skin is warm and dry. Findings: No rash. Neurological:      General: No focal deficit present. Mental Status: She is alert and oriented to person, place, and time. Gait: Gait normal.   Psychiatric:         Mood and Affect: Mood and affect normal.         Speech: Speech normal.         Behavior: Behavior normal.         Thought Content: Thought content normal.              No Known Allergies  Prior to Visit Medications    Medication Sig Taking?  Authorizing Provider   lisinopril-hydroCHLOROthiazide (PRINZIDE;ZESTORETIC) 20-25 MG per tablet Take 1 tablet by mouth daily Yes Scarlet Corral DO   ezetimibe (ZETIA) 10 MG tablet TAKE ONE TABLET BY MOUTH THREE TIMES A WEEK Yes Caroline Salas DO   amoxicillin (AMOXIL) 875 MG tablet Take 1 tablet by mouth 2 times daily for 10 days Yes LESVIA Austin CNP   DILT- MG extended release capsule TAKE ONE CAPSULE BY MOUTH DAILY Yes Caroline Salas DO   temazepam (RESTORIL) 15 MG capsule TAKE 1 CAPSULE BY MOUTH NIGHTLY AS NEEDED FOR SLEEP FOR UP TO 90 DAYS Yes Caroline Salas DO   atorvastatin (LIPITOR) 10 MG tablet TAKE ONE TABLET BY MOUTH THREE TIMES a week on monday, wednesday & friday Yes Caroline Salas DO   levothyroxine (SYNTHROID) 100 MCG tablet TAKE ONE TABLET BY MOUTH DAILY Yes Caroline Salas DO   fluticasone (FLONASE) 50 MCG/ACT nasal spray 2 sprays by Each Nostril route daily Yes Tanner Wilder DO   dicyclomine (BENTYL) 10 MG capsule Take 1 capsule by mouth 4 times daily Yes Scarlet Corral DO   famotidine (PEPCID) 20 MG tablet Take 1 tablet by mouth 2 times daily Yes Scarlet Corral DO   meclizine (ANTIVERT) 25 MG tablet Take 1 tablet by mouth 3 times daily as needed for Dizziness Yes Scarlet Corral DO   Calcium Carbonate-Vit D-Min (CALCIUM 1200) 8420-0754 MG-UNIT CHEW  Yes Historical Provider, MD   Cholecalciferol (VITAMIN

## 2022-05-08 DIAGNOSIS — E78.2 MIXED HYPERLIPIDEMIA: ICD-10-CM

## 2022-05-08 DIAGNOSIS — I10 ESSENTIAL HYPERTENSION: ICD-10-CM

## 2022-05-09 RX ORDER — ATORVASTATIN CALCIUM 10 MG/1
TABLET, FILM COATED ORAL
Qty: 36 TABLET | Refills: 1 | Status: SHIPPED | OUTPATIENT
Start: 2022-05-09

## 2022-05-09 RX ORDER — DILTIAZEM HYDROCHLORIDE 240 MG/1
CAPSULE, EXTENDED RELEASE ORAL
Qty: 90 CAPSULE | Refills: 1 | Status: SHIPPED | OUTPATIENT
Start: 2022-05-09

## 2022-05-25 LAB — MAMMOGRAPHY, EXTERNAL: NORMAL

## 2022-06-09 ENCOUNTER — OFFICE VISIT (OUTPATIENT)
Dept: FAMILY MEDICINE CLINIC | Age: 72
End: 2022-06-09
Payer: MEDICARE

## 2022-06-09 VITALS
OXYGEN SATURATION: 98 % | BODY MASS INDEX: 27.5 KG/M2 | DIASTOLIC BLOOD PRESSURE: 70 MMHG | WEIGHT: 136.4 LBS | TEMPERATURE: 97.8 F | HEIGHT: 59 IN | SYSTOLIC BLOOD PRESSURE: 158 MMHG | HEART RATE: 85 BPM

## 2022-06-09 DIAGNOSIS — J01.00 ACUTE NON-RECURRENT MAXILLARY SINUSITIS: Primary | ICD-10-CM

## 2022-06-09 PROCEDURE — 99213 OFFICE O/P EST LOW 20 MIN: CPT | Performed by: FAMILY MEDICINE

## 2022-06-09 PROCEDURE — 1123F ACP DISCUSS/DSCN MKR DOCD: CPT | Performed by: FAMILY MEDICINE

## 2022-06-09 RX ORDER — CEFDINIR 300 MG/1
300 CAPSULE ORAL 2 TIMES DAILY
Qty: 14 CAPSULE | Refills: 0 | Status: SHIPPED | OUTPATIENT
Start: 2022-06-09 | End: 2022-06-16

## 2022-06-09 RX ORDER — PREDNISONE 10 MG/1
TABLET ORAL
Qty: 30 TABLET | Refills: 0 | Status: SHIPPED | OUTPATIENT
Start: 2022-06-09 | End: 2022-06-21

## 2022-06-09 ASSESSMENT — ENCOUNTER SYMPTOMS
ABDOMINAL PAIN: 0
VOMITING: 0
SHORTNESS OF BREATH: 0
EYE DISCHARGE: 0
COUGH: 1
WHEEZING: 0
RHINORRHEA: 1
DIARRHEA: 0
NAUSEA: 0
BACK PAIN: 0
SINUS PAIN: 0
SORE THROAT: 0
CONSTIPATION: 0
SINUS PRESSURE: 0

## 2022-06-09 NOTE — PROGRESS NOTES
22  Roseanne Heading [place] : 1950 Sex: female  Age: 66 y.o. Chief Complaint   Patient presents with    Cough     started 1 week ago. multiple negative at home covid tests     HPI:  66 y.o. female presents for acute visit due to URI symptoms. Upper Respiratory Symptoms  Patient complains of congestion, nasal blockage, post nasal drip and dry cough. Patient denies anorexia, chest pain, chills, dizziness, fevers, myalgias, nausea and shortness of breath. She has had symptoms for 1 week. Symptoms have unchanged since that time. She has tried Mucinex, cough drops, Claritin at home without improvement in symptoms. ROS:  Review of Systems   Constitutional: Positive for fatigue. Negative for appetite change, chills and fever. HENT: Positive for congestion, ear pain (R), postnasal drip and rhinorrhea. Negative for sinus pressure, sinus pain and sore throat. Eyes: Negative for discharge. Respiratory: Positive for cough. Negative for shortness of breath and wheezing. Cardiovascular: Negative for chest pain and palpitations. Gastrointestinal: Negative for abdominal pain, constipation, diarrhea, nausea and vomiting. Musculoskeletal: Negative for back pain. Skin: Negative for rash. Neurological: Negative for dizziness and headaches. Hematological: Negative for adenopathy. All other systems reviewed and are negative.      Current Outpatient Medications on File Prior to Visit   Medication Sig Dispense Refill    DILT- MG extended release capsule TAKE ONE CAPSULE BY MOUTH DAILY 90 capsule 1    atorvastatin (LIPITOR) 10 MG tablet TAKE 1 TABLET BY MOUTH 3 TIMES A WEEK ON MONDAY, WEDNESDAY, AND FRIDAY 36 tablet 1    promethazine (PHENERGAN) 25 MG tablet TAKE ONE TABLET BY MOUTH EVERY 6 HOURS AS NEEDED for nausea      lisinopril-hydroCHLOROthiazide (PRINZIDE;ZESTORETIC) 20-25 MG per tablet Take 1 tablet by mouth daily 90 tablet 1    ezetimibe (ZETIA) 10 MG tablet TAKE ONE TABLET BY MOUTH THREE TIMES A WEEK 36 tablet 1    levothyroxine (SYNTHROID) 100 MCG tablet TAKE ONE TABLET BY MOUTH DAILY 90 tablet 1    fluticasone (FLONASE) 50 MCG/ACT nasal spray 2 sprays by Each Nostril route daily 16 g 5    Estradiol (VAGIFEM) 10 MCG TABS vaginal tablet INSERT ONE TABLET in the VAGINA AT BEDTIME (nightly) for 2 weeks  then twice a week thereafter      dicyclomine (BENTYL) 10 MG capsule Take 1 capsule by mouth 4 times daily 360 capsule 1    famotidine (PEPCID) 20 MG tablet Take 1 tablet by mouth 2 times daily 180 tablet 1    meclizine (ANTIVERT) 25 MG tablet Take 1 tablet by mouth 3 times daily as needed for Dizziness 270 tablet 0    Calcium Carbonate-Vit D-Min (CALCIUM 1200) 7470-6083 MG-UNIT CHEW       Cholecalciferol (VITAMIN D3) 1.25 MG (01932 UT) CAPS Take by mouth      aspirin 81 MG tablet Take 81 mg by mouth daily Ld 3/21/2019 per dr. Lara       No current facility-administered medications on file prior to visit.        No Known Allergies    Past Medical History:   Diagnosis Date    Acquired hypothyroidism 2017    Cataract, left eye     Essential hypertension 2017    Hyperlipidemia     Hypertension     Lumbar spinal stenosis     Radiculitis of left cervical region 2017    Thyroid disease      Past Surgical History:   Procedure Laterality Date    CATARACT REMOVAL WITH IMPLANT Right 2016     SECTION      x 2    COLONOSCOPY      INTRACAPSULAR CATARACT EXTRACTION Left 2019    LEFT EYE CATARACT EXTRACTION WITH  IOL IMPLANT [facility]    PELVIC LAPAROSCOPY       Family History   Problem Relation Age of Onset    Heart Disease Mother     Other Father         sepsis from TKA    COPD Father     Hypothyroidism Father     Heart Attack Maternal Grandmother     Heart Attack Maternal Grandfather     Cancer Maternal Aunt         Hodgkin lymphoma    No Known Problems Brother      Social History Tobacco History     Smoking Status  Former Smoker Smoking Start Date  1/1/1971 Quit date  1/26/2011 Smoking Frequency  0.5 packs/day for 40 years (20 pk yrs)    Smokeless Tobacco Use  Never Used                 Vitals:    06/09/22 0820   BP: (!) 158/70   Pulse: 85   Temp: 97.8 °F (36.6 °C)   SpO2: 98%   Weight: 136 lb 6.4 oz (61.9 kg)   Height: 4' 11\" (1.499 m)       Physical Exam:  Physical Exam  Vitals and nursing note reviewed. Constitutional:       General: She is not in acute distress. Appearance: Normal appearance. She is well-developed. HENT:      Head: Normocephalic and atraumatic. Right Ear: Hearing, tympanic membrane, ear canal and external ear normal. There is no impacted cerumen. Left Ear: Hearing, tympanic membrane, ear canal and external ear normal. There is no impacted cerumen. Nose: Congestion present. No mucosal edema or rhinorrhea. Right Sinus: No maxillary sinus tenderness or frontal sinus tenderness. Left Sinus: No maxillary sinus tenderness or frontal sinus tenderness. Mouth/Throat:      Mouth: Mucous membranes are moist.      Pharynx: Oropharynx is clear. Posterior oropharyngeal erythema present. No oropharyngeal exudate. Eyes:      General:         Right eye: No discharge. Left eye: No discharge. Extraocular Movements: Extraocular movements intact. Conjunctiva/sclera: Conjunctivae normal.   Cardiovascular:      Rate and Rhythm: Normal rate and regular rhythm. Heart sounds: Normal heart sounds. No murmur heard. Pulmonary:      Effort: Pulmonary effort is normal. No respiratory distress. Breath sounds: Normal breath sounds. No wheezing. Musculoskeletal:         General: Normal range of motion. Cervical back: Normal range of motion and neck supple. No tenderness. Right lower leg: No edema. Left lower leg: No edema. Lymphadenopathy:      Cervical: Cervical adenopathy present.    Skin:     General: Skin is warm and dry. Findings: No rash. Neurological:      General: No focal deficit present. Mental Status: She is alert and oriented to person, place, and time. Gait: Gait normal.   Psychiatric:         Mood and Affect: Mood normal.         Behavior: Behavior normal.         Thought Content: Thought content normal.         No results found for this visit on 06/09/22. Assessment and Plan:  Guanako Altamirano was seen today for cough. Diagnoses and all orders for this visit:    Acute non-recurrent maxillary sinusitis  -     predniSONE (DELTASONE) 10 MG tablet; Take 4 tablets by mouth daily for 3 days, THEN 3 tablets daily for 3 days, THEN 2 tablets daily for 3 days, THEN 1 tablet daily for 3 days. -     cefdinir (OMNICEF) 300 MG capsule; Take 1 capsule by mouth 2 times daily for 7 days    Patient's symptoms consistent with sinusitis. She is in no acute distress and VSS. Recommended that she use Flonase/Nasacort, Claritin/Zyrtec and Mucinex to help with cough. Patient to call or return for repeat exam if symptoms are worsening. Return if symptoms worsen or fail to improve.       Seen By:  Barb Gonzalez DO

## 2022-06-14 DIAGNOSIS — E03.9 ACQUIRED HYPOTHYROIDISM: ICD-10-CM

## 2022-06-14 RX ORDER — LEVOTHYROXINE SODIUM 0.1 MG/1
TABLET ORAL
Qty: 90 TABLET | Refills: 1 | Status: SHIPPED | OUTPATIENT
Start: 2022-06-14

## 2022-06-25 ENCOUNTER — OFFICE VISIT (OUTPATIENT)
Dept: FAMILY MEDICINE CLINIC | Age: 72
End: 2022-06-25
Payer: MEDICARE

## 2022-06-25 VITALS
WEIGHT: 136 LBS | HEART RATE: 85 BPM | DIASTOLIC BLOOD PRESSURE: 70 MMHG | OXYGEN SATURATION: 98 % | SYSTOLIC BLOOD PRESSURE: 144 MMHG | HEIGHT: 59 IN | BODY MASS INDEX: 27.42 KG/M2 | TEMPERATURE: 97.1 F

## 2022-06-25 DIAGNOSIS — R09.82 POSTNASAL DRIP: ICD-10-CM

## 2022-06-25 DIAGNOSIS — H65.194 RELAPSING OTITIS MEDIA OF RIGHT EAR WITH EFFUSION: ICD-10-CM

## 2022-06-25 DIAGNOSIS — J01.90 ACUTE NON-RECURRENT SINUSITIS, UNSPECIFIED LOCATION: ICD-10-CM

## 2022-06-25 DIAGNOSIS — J01.00 ACUTE NON-RECURRENT MAXILLARY SINUSITIS: Primary | ICD-10-CM

## 2022-06-25 PROCEDURE — 99213 OFFICE O/P EST LOW 20 MIN: CPT | Performed by: PHYSICIAN ASSISTANT

## 2022-06-25 PROCEDURE — 1123F ACP DISCUSS/DSCN MKR DOCD: CPT | Performed by: PHYSICIAN ASSISTANT

## 2022-06-25 RX ORDER — PREDNISONE 10 MG/1
TABLET ORAL
Qty: 18 TABLET | Refills: 0 | Status: SHIPPED
Start: 2022-06-25 | End: 2022-09-25 | Stop reason: ALTCHOICE

## 2022-06-25 RX ORDER — AMOXICILLIN AND CLAVULANATE POTASSIUM 875; 125 MG/1; MG/1
1 TABLET, FILM COATED ORAL 2 TIMES DAILY
Qty: 20 TABLET | Refills: 0 | Status: SHIPPED | OUTPATIENT
Start: 2022-06-25 | End: 2022-07-05

## 2022-06-25 RX ORDER — CHLORPHENIRAMINE MALEATE 4 MG/1
4 TABLET ORAL EVERY 6 HOURS PRN
Qty: 20 TABLET | Refills: 0 | Status: SHIPPED | OUTPATIENT
Start: 2022-06-25

## 2022-06-25 RX ORDER — IPRATROPIUM BROMIDE 42 UG/1
2 SPRAY, METERED NASAL 4 TIMES DAILY
Qty: 15 ML | Refills: 0 | Status: SHIPPED
Start: 2022-06-25 | End: 2022-09-25 | Stop reason: SDUPTHER

## 2022-06-25 NOTE — PROGRESS NOTES
22  Lolly Vaca [place] : 1950 Sex: female  Age 66 y.o. Subjective:  Chief Complaint   Patient presents with    Otalgia     sinus drainage, cough. pt was seen by pcp  and not any better         HPI:   Tunde Almeida , 66 y.o. female presents to express care for evaluation of sinus congestion, drainage, cough, ear pain    HPI  27-year-old female presents to express care for evaluation of sinus congestion, drainage, cough. The patient has had the symptoms ongoing since 2022. The patient was placed on prednisone and Omnicef. The patient was using nasal spray, decongestant, Mucinex. The patient states that the symptoms do not really seem to be improving. She did note that while on the medication she had some improvement but then once off all the symptoms have returned. Both ears are bothering her the right greater than the left. She still having quite a bit of congestion, drainage. She is coughing but does not feel that its into her chest.      ROS:   Unless otherwise stated in this report the patient's positive and negative responses for review of systems for constitutional, eyes, ENT, cardiovascular, respiratory, gastrointestinal, neurological, , musculoskeletal, and integument systems and related systems to the presenting problem are either stated in the history of present illness or were not pertinent or were negative for the symptoms and/or complaints related to the presenting medical problem. Positives and pertinent negatives as per HPI. All others reviewed and are negative.       PMH:     Past Medical History:   Diagnosis Date    Acquired hypothyroidism 2017    Cataract, left eye     Essential hypertension 2017    Hyperlipidemia     Hypertension     Lumbar spinal stenosis     Radiculitis of left cervical region 2017    Thyroid disease        Past Surgical History:   Procedure Laterality Date    CATARACT REMOVAL WITH IMPLANT Right 2016     SECTION      x 2    COLONOSCOPY      INTRACAPSULAR CATARACT EXTRACTION Left 2019    LEFT EYE CATARACT EXTRACTION WITH  IOL IMPLANT [facility]         Family History   Problem Relation Age of Onset    Heart Disease Mother     Other Father         sepsis from TKA    COPD Father     Hypothyroidism Father     Heart Attack Maternal Grandmother     Heart Attack Maternal Grandfather     Cancer Maternal Aunt         Hodgkin lymphoma    No Known Problems Brother        Medications:     Current Outpatient Medications:     amoxicillin-clavulanate (AUGMENTIN) 875-125 MG per tablet, Take 1 tablet by mouth 2 times daily for 10 days, Disp: 20 tablet, Rfl: 0    predniSONE (DELTASONE) 10 MG tablet, 3 tabs once daily for 3 days, 2 tabs once daily for 3 days, 1 tab once daily for 3 days, Disp: 18 tablet, Rfl: 0    chlorpheniramine (ALLER-CHLOR) 4 MG tablet, Take 1 tablet by mouth every 6 hours as needed for Allergies, Disp: 20 tablet, Rfl: 0    ipratropium (ATROVENT) 0.06 % nasal spray, 2 sprays by Each Nostril route 4 times daily, Disp: 15 mL, Rfl: 0    levothyroxine (SYNTHROID) 100 MCG tablet, TAKE ONE TABLET BY MOUTH DAILY, Disp: 90 tablet, Rfl: 1    DILT- MG extended release capsule, TAKE ONE CAPSULE BY MOUTH DAILY, Disp: 90 capsule, Rfl: 1    atorvastatin (LIPITOR) 10 MG tablet, TAKE 1 TABLET BY MOUTH 3 TIMES A WEEK ON MONDAY, WEDNESDAY, AND FRIDAY, Disp: 36 tablet, Rfl: 1    promethazine (PHENERGAN) 25 MG tablet, TAKE ONE TABLET BY MOUTH EVERY 6 HOURS AS NEEDED for nausea, Disp: , Rfl:     lisinopril-hydroCHLOROthiazide (PRINZIDE;ZESTORETIC) 20-25 MG per tablet, Take 1 tablet by mouth daily, Disp: 90 tablet, Rfl: 1    ezetimibe (ZETIA) 10 MG tablet, TAKE ONE TABLET BY MOUTH THREE TIMES A WEEK, Disp: 36 tablet, Rfl: 1    fluticasone (FLONASE) 50 MCG/ACT nasal spray, 2 sprays by Each Nostril route daily, Disp: 16 g, Rfl: 5   Estradiol (VAGIFEM) 10 MCG TABS vaginal tablet, INSERT ONE TABLET in the VAGINA AT BEDTIME (nightly) for 2 weeks  then twice a week thereafter, Disp: , Rfl:     dicyclomine (BENTYL) 10 MG capsule, Take 1 capsule by mouth 4 times daily, Disp: 360 capsule, Rfl: 1    famotidine (PEPCID) 20 MG tablet, Take 1 tablet by mouth 2 times daily, Disp: 180 tablet, Rfl: 1    meclizine (ANTIVERT) 25 MG tablet, Take 1 tablet by mouth 3 times daily as needed for Dizziness, Disp: 270 tablet, Rfl: 0    Calcium Carbonate-Vit D-Min (CALCIUM 1200) 5159-1770 MG-UNIT CHEW, , Disp: , Rfl:     Cholecalciferol (VITAMIN D3) 1.25 MG (81197 UT) CAPS, Take by mouth, Disp: , Rfl:     aspirin 81 MG tablet, Take 81 mg by mouth daily Ld 3/21/2019 per dr. Sampson Carpio, Disp: , Rfl:     Allergies:   No Known Allergies    Social History:     Social History     Tobacco Use    Smoking status: Former Smoker     Packs/day: 0.50     Years: 40.00     Pack years: 20.00     Start date:      Quit date: 2011     Years since quittin.4    Smokeless tobacco: Never Used   Vaping Use    Vaping Use: Never used   Substance Use Topics    Alcohol use: Yes     Comment: weekends / 2 glasses wine    Drug use: No       Patient lives at home. Physical Exam:     Vitals:    22 0856   BP: (!) 144/70   Pulse: 85   Temp: 97.1 °F (36.2 °C)   SpO2: 98%   Weight: 136 lb (61.7 kg)   Height: 4' 11\" (1.499 m)       Exam:  Physical Exam  Nurse's notes and vital signs reviewed. The patient is not hypoxic. ? General: Alert, no acute distress, patient resting comfortably Patient is not toxic or lethargic. Skin: Warm, intact, no pallor noted. There is no evidence of rash at this time. Head: Normocephalic, atraumatic  Eye: Normal conjunctiva  Ears, Nose, Throat: Right tympanic membrane erythematous around the perimeter but no evidence of perforation, no diffuse erythema but evidence of air-fluid levels, left tympanic membrane clear.  No drainage or discharge noted. No pre- or post-auricular tenderness, erythema, or swelling noted. Clear drainage, congestion  Posterior oropharynx shows erythema but no evidence of tonsillar hypertrophy, or exudate. the uvula is midline. No trismus or drooling is noted. Moist mucous membranes. Cardiovascular: Regular Rate and Rhythm  Respiratory: No acute distress, no rhonchi, wheezing or crackles noted. No stridor or retractions are noted. Neurological: A&O x4, normal speech  Psychiatric: Cooperative         Testing:           Medical Decision Making:     Vital signs reviewed    Past medical history reviewed. Allergies reviewed. Medications reviewed. Patient on arrival does not appear to be in any apparent distress or discomfort. The patient has been seen and evaluated. The patient does not appear to be toxic or lethargic. The patient will be treated with Augmentin, chlorphentermine, prednisone, and Atrovent nasal spray. The patient was educated on the proper dosage of motrin and tylenol and the appropriate intervals of each. The patient is to increase fluid intake over the next several days. The patient is to use OTC decongestant as needed. The patient is to return to express care or go directly to the emergency department should any of the signs or symptoms worsen. The patient is to followup with primary care physician in 2-3 days for repeat evaluation. The patient has no other questions or concerns at this time the patient will be discharged home. Clinical Impression:   [facility] was seen today for otalgia. Diagnoses and all orders for this visit:    Acute non-recurrent maxillary sinusitis    Acute non-recurrent sinusitis, unspecified location    Postnasal drip    Relapsing otitis media of right ear with effusion    Other orders  -     amoxicillin-clavulanate (AUGMENTIN) 875-125 MG per tablet;  Take 1 tablet by mouth 2 times daily for 10 days  -     predniSONE (DELTASONE) 10 MG tablet; 3 tabs once daily for 3 days, 2 tabs once daily for 3 days, 1 tab once daily for 3 days  -     chlorpheniramine (ALLER-CHLOR) 4 MG tablet; Take 1 tablet by mouth every 6 hours as needed for Allergies  -     ipratropium (ATROVENT) 0.06 % nasal spray; 2 sprays by Each Nostril route 4 times daily        The patient is to call for any concerns or return if any of the signs or symptoms worsen. The patient is to follow-up with PCP in the next 2-3 days for repeat evaluation repeat assessment or go directly to the emergency department.      SIGNATURE: Manny Butcher III, PA-C

## 2022-09-19 RX ORDER — EZETIMIBE 10 MG/1
TABLET ORAL
Qty: 36 TABLET | Refills: 3 | Status: SHIPPED | OUTPATIENT
Start: 2022-09-19

## 2022-09-25 ENCOUNTER — OFFICE VISIT (OUTPATIENT)
Dept: FAMILY MEDICINE CLINIC | Age: 72
End: 2022-09-25
Payer: MEDICARE

## 2022-09-25 VITALS
WEIGHT: 136 LBS | HEART RATE: 67 BPM | TEMPERATURE: 97 F | SYSTOLIC BLOOD PRESSURE: 130 MMHG | OXYGEN SATURATION: 98 % | HEIGHT: 59 IN | RESPIRATION RATE: 16 BRPM | BODY MASS INDEX: 27.42 KG/M2 | DIASTOLIC BLOOD PRESSURE: 62 MMHG

## 2022-09-25 DIAGNOSIS — J06.9 VIRAL URI: Primary | ICD-10-CM

## 2022-09-25 DIAGNOSIS — J02.9 SORE THROAT: ICD-10-CM

## 2022-09-25 LAB
Lab: NORMAL
PERFORMING INSTRUMENT: NORMAL
QC PASS/FAIL: NORMAL
SARS-COV-2, POC: NORMAL

## 2022-09-25 PROCEDURE — 99213 OFFICE O/P EST LOW 20 MIN: CPT | Performed by: STUDENT IN AN ORGANIZED HEALTH CARE EDUCATION/TRAINING PROGRAM

## 2022-09-25 PROCEDURE — 87426 SARSCOV CORONAVIRUS AG IA: CPT | Performed by: STUDENT IN AN ORGANIZED HEALTH CARE EDUCATION/TRAINING PROGRAM

## 2022-09-25 PROCEDURE — 1123F ACP DISCUSS/DSCN MKR DOCD: CPT | Performed by: STUDENT IN AN ORGANIZED HEALTH CARE EDUCATION/TRAINING PROGRAM

## 2022-09-25 RX ORDER — IPRATROPIUM BROMIDE 42 UG/1
2 SPRAY, METERED NASAL 4 TIMES DAILY
Qty: 15 ML | Refills: 0 | Status: SHIPPED | OUTPATIENT
Start: 2022-09-25

## 2022-09-25 RX ORDER — PANTOPRAZOLE SODIUM 40 MG/1
40 TABLET, DELAYED RELEASE ORAL DAILY
COMMUNITY

## 2022-09-25 RX ORDER — FLUTICASONE PROPIONATE 50 MCG
2 SPRAY, SUSPENSION (ML) NASAL DAILY
Qty: 16 G | Refills: 11 | Status: SHIPPED | OUTPATIENT
Start: 2022-09-25

## 2022-09-25 NOTE — PROGRESS NOTES
Urgent Care      Patient:  Douglas Whitehead 67 y.o. female     Date of Service: 21      Chief complaint:   Chief Complaint   Patient presents with    Pharyngitis     Thursday night/Friday morning    Cough    Headache    Nasal Congestion           History ofPresent Illness   The patient is a 67 y.o. female  presented to the clinic with complaints as above. Congestion  -new issue  -started a couple days ago  -started as dry throat  -having a lot of headache, rhinorrhea, dry cough  -denies any fever, chills, or SOB  -has been taking mucinex which does help somewhat     Past Medical History:      Diagnosis Date    Acquired hypothyroidism 2017    Cataract, left eye     Essential hypertension 2017    Hyperlipidemia     Hypertension     Lumbar spinal stenosis     Radiculitis of left cervical region 2017    Thyroid disease        PastSurgical History:        Procedure Laterality Date    CATARACT REMOVAL WITH IMPLANT Right 2016     SECTION      x 2    COLONOSCOPY      INTRACAPSULAR CATARACT EXTRACTION Left 2019    LEFT EYE CATARACT EXTRACTION WITH  IOL IMPLANT performed by Clemente Calderon MD at [address] [place] [address]         Allergies:    Patient has no known allergies.     Social History:   Social History     Socioeconomic History    Marital status:      Spouse name: Not on file    Number of children: Not on file    Years of education: Not on file    Highest education level: Not on file   Occupational History    Not on file   Tobacco Use    Smoking status: Former     Packs/day: 0.50     Years: 40.00     Pack years: 20.00     Types: Cigarettes     Start date: [employer] [facility]     Quit date: 2011     Years since quittin.6    Smokeless tobacco: Never   Vaping Use    Vaping Use: Never used   Substance and Sexual Activity    Alcohol use: Yes     Comment: weekends / 2 glasses wine    Drug use: No    Sexual activity: Not on file   Other Topics Concern    Not on file Social History Narrative    Not on file     Social Determinants of Health     Financial Resource Strain: Low Risk     Difficulty of Paying Living Expenses: Not hard at all   Food Insecurity: No Food Insecurity    Worried About Running Out of Food in the Last Year: Never true    Ran Out of Food in the Last Year: Never true   Transportation Needs: Not on file   Physical Activity: Inactive    Days of Exercise per Week: 0 days    Minutes of Exercise per Session: 0 min   Stress: Not on file   Social Connections: Not on file   Intimate Partner Violence: Not on file   Housing Stability: Not on file        Family History:       Problem Relation Age of Onset    Heart Disease Mother     Other Father         sepsis from TKA    COPD Father     Hypothyroidism Father     Heart Attack Maternal Grandmother     Heart Attack Maternal Grandfather     Cancer Maternal Aunt         Hodgkin lymphoma    No Known Problems Brother        Review of Systems:   Review of Systems - as above     Physical Exam   Vitals: /62   Pulse 67   Temp 97 °F (36.1 °C) (Temporal)   Resp 16   Ht 4' 11\" (1.499 m)   Wt 136 lb (61.7 kg)   SpO2 98%   BMI 27.47 kg/m²   Physical Exam  Constitutional:       Appearance: She is well-developed. HENT:      Head: Normocephalic. Nose: Mucosal edema, congestion and rhinorrhea present. Rhinorrhea is clear. Right Turbinates: Enlarged and swollen. Left Turbinates: Enlarged and swollen. Cardiovascular:      Rate and Rhythm: Normal rate and regular rhythm. Heart sounds: Normal heart sounds. No murmur heard. Pulmonary:      Effort: Pulmonary effort is normal. No respiratory distress. Breath sounds: Normal breath sounds. No wheezing. Abdominal:      General: Bowel sounds are normal.      Palpations: Abdomen is soft. Musculoskeletal:         General: No tenderness. Normal range of motion. Skin:     General: Skin is warm and dry.    Neurological:      Mental Status: She is alert and oriented to person, place, and time. Psychiatric:         Behavior: Behavior normal.           Assessment and Plan       1. Viral URI  New issue  -Covid negative  -Given symptoms and duration, could be viral vs allergic in nature, will trial supportive therapy as below  -Advised patient to call PCP if no improvement in symptoms  - ipratropium (ATROVENT) 0.06 % nasal spray; 2 sprays by Each Nostril route 4 times daily  Dispense: 15 mL; Refill: 0  - fluticasone (FLONASE) 50 MCG/ACT nasal spray; 2 sprays by Nasal route daily  Dispense: 16 g; Refill: 11    2. Sore throat  As above   - POCT COVID-19, Antigen    Counseled regarding above diagnosis, including possible risks and complications,  especially if left uncontrolled. Counseled regarding the possible side effects, risks, benefits and alternatives to treatment;patient and/or guardian verbalizes understanding, agrees, feels comfortable with and wishes to proceed with above treatment plan. Call or go to [address] if symptoms worsen or persist. Advised patient to call with any new medication issues, and, as applicable, read all Rx info from pharmacy to assure aware of all possible risks and side effects of medicationbefore taking. Patient and/or guardian given opportunity to ask questions/raise concerns. The patient verbalized comfort and understanding ofinstructions. I encourage further reading and education about your health conditions. Information on many health conditions is provided by [facility] [employer]: [email or web address]  Please bring any questions to me at your nextvisit. Return to Office: Return if symptoms worsen or fail to improve.     Medication List:    Current Outpatient Medications   Medication Sig Dispense Refill    pantoprazole (PROTONIX) 40 MG tablet Take 40 mg by mouth daily      ipratropium (ATROVENT) 0.06 % nasal spray 2 sprays by Each Nostril route 4 times daily 15 mL 0    fluticasone (FLONASE) 50 MCG/ACT nasal spray 2 sprays by Nasal route daily 16 g 11    ezetimibe (ZETIA) 10 MG tablet TAKE 1 TABLET BY MOUTH 3 TIMES A WEEK 36 tablet 3    chlorpheniramine (ALLER-CHLOR) 4 MG tablet Take 1 tablet by mouth every 6 hours as needed for Allergies 20 tablet 0    levothyroxine (SYNTHROID) 100 MCG tablet TAKE ONE TABLET BY MOUTH DAILY 90 tablet 1    DILT- MG extended release capsule TAKE ONE CAPSULE BY MOUTH DAILY 90 capsule 1    atorvastatin (LIPITOR) 10 MG tablet TAKE 1 TABLET BY MOUTH 3 TIMES A WEEK ON MONDAY, WEDNESDAY, AND FRIDAY 36 tablet 1    lisinopril-hydroCHLOROthiazide (PRINZIDE;ZESTORETIC) 20-25 MG per tablet Take 1 tablet by mouth daily 90 tablet 1    Calcium Carbonate-Vit D-Min (CALCIUM 1200) 6785-6683 MG-UNIT CHEW       Cholecalciferol (VITAMIN D3) 1.25 MG (18166 UT) CAPS Take by mouth      aspirin 81 MG tablet Take 81 mg by mouth daily Ld 3/21/2019 per dr. Manuela Schmidt      Estradiol (VAGIFEM) 10 MCG TABS vaginal tablet INSERT ONE TABLET in the VAGINA AT BEDTIME (nightly) for 2 weeks  then twice a week thereafter (Patient not taking: Reported on 9/25/2022)      dicyclomine (BENTYL) 10 MG capsule Take 1 capsule by mouth 4 times daily (Patient not taking: Reported on 9/25/2022) 360 capsule 1    famotidine (PEPCID) 20 MG tablet Take 1 tablet by mouth 2 times daily (Patient not taking: Reported on 9/25/2022) 180 tablet 1    meclizine (ANTIVERT) 25 MG tablet Take 1 tablet by mouth 3 times daily as needed for Dizziness (Patient not taking: Reported on 9/25/2022) 270 tablet 0     No current facility-administered medications for this visit. Ector Lenz, DO       This document may have been prepared at least partially through the use of voice recognition software. Although effort is taken to assure the accuracy ofthis document, it is possible that grammatical, syntax,  or spelling errors may occur.

## 2022-10-15 DIAGNOSIS — I10 ESSENTIAL HYPERTENSION: ICD-10-CM

## 2022-10-17 RX ORDER — LISINOPRIL AND HYDROCHLOROTHIAZIDE 25; 20 MG/1; MG/1
TABLET ORAL
Qty: 90 TABLET | Refills: 1 | Status: SHIPPED | OUTPATIENT
Start: 2022-10-17

## 2022-11-12 DIAGNOSIS — I10 ESSENTIAL HYPERTENSION: ICD-10-CM

## 2022-11-12 DIAGNOSIS — E78.2 MIXED HYPERLIPIDEMIA: ICD-10-CM

## 2022-11-14 RX ORDER — DILTIAZEM HYDROCHLORIDE 240 MG/1
CAPSULE, EXTENDED RELEASE ORAL
Qty: 90 CAPSULE | Refills: 1 | Status: SHIPPED | OUTPATIENT
Start: 2022-11-14

## 2022-11-14 RX ORDER — ATORVASTATIN CALCIUM 10 MG/1
TABLET, FILM COATED ORAL
Qty: 36 TABLET | Refills: 1 | Status: SHIPPED
Start: 2022-11-14 | End: 2022-11-28 | Stop reason: ALTCHOICE

## 2022-11-21 ENCOUNTER — TELEPHONE (OUTPATIENT)
Dept: PRIMARY CARE CLINIC | Age: 72
End: 2022-11-21

## 2022-11-21 DIAGNOSIS — R73.01 IMPAIRED FASTING GLUCOSE: ICD-10-CM

## 2022-11-21 DIAGNOSIS — E55.9 VITAMIN D INSUFFICIENCY: ICD-10-CM

## 2022-11-21 DIAGNOSIS — E03.9 ACQUIRED HYPOTHYROIDISM: ICD-10-CM

## 2022-11-21 DIAGNOSIS — I10 ESSENTIAL HYPERTENSION: Primary | ICD-10-CM

## 2022-11-23 DIAGNOSIS — E03.9 ACQUIRED HYPOTHYROIDISM: ICD-10-CM

## 2022-11-23 DIAGNOSIS — E55.9 VITAMIN D INSUFFICIENCY: ICD-10-CM

## 2022-11-23 DIAGNOSIS — R73.01 IMPAIRED FASTING GLUCOSE: ICD-10-CM

## 2022-11-23 DIAGNOSIS — I10 ESSENTIAL HYPERTENSION: ICD-10-CM

## 2022-11-23 LAB
ALBUMIN SERPL-MCNC: 4.1 G/DL (ref 3.5–5.2)
ALP BLD-CCNC: 80 U/L (ref 35–104)
ALT SERPL-CCNC: 15 U/L (ref 0–32)
ANION GAP SERPL CALCULATED.3IONS-SCNC: 12 MMOL/L (ref 7–16)
AST SERPL-CCNC: 19 U/L (ref 0–31)
BASOPHILS ABSOLUTE: 0.02 E9/L (ref 0–0.2)
BASOPHILS RELATIVE PERCENT: 0.2 % (ref 0–2)
BILIRUB SERPL-MCNC: 0.3 MG/DL (ref 0–1.2)
BUN BLDV-MCNC: 26 MG/DL (ref 6–23)
CALCIUM SERPL-MCNC: 9.4 MG/DL (ref 8.6–10.2)
CHLORIDE BLD-SCNC: 106 MMOL/L (ref 98–107)
CHOLESTEROL, TOTAL: 198 MG/DL (ref 0–199)
CO2: 26 MMOL/L (ref 22–29)
CREAT SERPL-MCNC: 1 MG/DL (ref 0.5–1)
EOSINOPHILS ABSOLUTE: 0.09 E9/L (ref 0.05–0.5)
EOSINOPHILS RELATIVE PERCENT: 1.1 % (ref 0–6)
FOLATE: >20 NG/ML (ref 4.8–24.2)
GFR SERPL CREATININE-BSD FRML MDRD: 59 ML/MIN/1.73
GLUCOSE BLD-MCNC: 98 MG/DL (ref 74–99)
HBA1C MFR BLD: 5.6 % (ref 4–5.6)
HCT VFR BLD CALC: 41.7 % (ref 34–48)
HDLC SERPL-MCNC: 53 MG/DL
HEMOGLOBIN: 13.5 G/DL (ref 11.5–15.5)
IMMATURE GRANULOCYTES #: 0.02 E9/L
IMMATURE GRANULOCYTES %: 0.2 % (ref 0–5)
LDL CHOLESTEROL CALCULATED: 94 MG/DL (ref 0–99)
LYMPHOCYTES ABSOLUTE: 4.03 E9/L (ref 1.5–4)
LYMPHOCYTES RELATIVE PERCENT: 49 % (ref 20–42)
MCH RBC QN AUTO: 31.2 PG (ref 26–35)
MCHC RBC AUTO-ENTMCNC: 32.4 % (ref 32–34.5)
MCV RBC AUTO: 96.3 FL (ref 80–99.9)
MONOCYTES ABSOLUTE: 0.52 E9/L (ref 0.1–0.95)
MONOCYTES RELATIVE PERCENT: 6.3 % (ref 2–12)
NEUTROPHILS ABSOLUTE: 3.55 E9/L (ref 1.8–7.3)
NEUTROPHILS RELATIVE PERCENT: 43.2 % (ref 43–80)
PDW BLD-RTO: 12.5 FL (ref 11.5–15)
PLATELET # BLD: 205 E9/L (ref 130–450)
PMV BLD AUTO: 11.4 FL (ref 7–12)
POTASSIUM SERPL-SCNC: 4.4 MMOL/L (ref 3.5–5)
RBC # BLD: 4.33 E12/L (ref 3.5–5.5)
SODIUM BLD-SCNC: 144 MMOL/L (ref 132–146)
T4 FREE: 1.28 NG/DL (ref 0.93–1.7)
TOTAL PROTEIN: 6.6 G/DL (ref 6.4–8.3)
TRIGL SERPL-MCNC: 255 MG/DL (ref 0–149)
TSH SERPL DL<=0.05 MIU/L-ACNC: 0.59 UIU/ML (ref 0.27–4.2)
URIC ACID, SERUM: 6.2 MG/DL (ref 2.4–5.7)
VITAMIN B-12: 546 PG/ML (ref 211–946)
VITAMIN D 25-HYDROXY: 57 NG/ML (ref 30–100)
VLDLC SERPL CALC-MCNC: 51 MG/DL
WBC # BLD: 8.2 E9/L (ref 4.5–11.5)

## 2022-11-28 ENCOUNTER — OFFICE VISIT (OUTPATIENT)
Dept: PRIMARY CARE CLINIC | Age: 72
End: 2022-11-28
Payer: MEDICARE

## 2022-11-28 VITALS
HEIGHT: 59 IN | OXYGEN SATURATION: 96 % | TEMPERATURE: 97.8 F | HEART RATE: 86 BPM | WEIGHT: 139 LBS | BODY MASS INDEX: 28.02 KG/M2 | DIASTOLIC BLOOD PRESSURE: 62 MMHG | SYSTOLIC BLOOD PRESSURE: 144 MMHG

## 2022-11-28 DIAGNOSIS — E78.2 MIXED HYPERLIPIDEMIA: ICD-10-CM

## 2022-11-28 DIAGNOSIS — E55.9 VITAMIN D INSUFFICIENCY: ICD-10-CM

## 2022-11-28 DIAGNOSIS — M79.604 BILATERAL LEG PAIN: ICD-10-CM

## 2022-11-28 DIAGNOSIS — I10 ESSENTIAL HYPERTENSION: Primary | ICD-10-CM

## 2022-11-28 DIAGNOSIS — E03.9 ACQUIRED HYPOTHYROIDISM: ICD-10-CM

## 2022-11-28 DIAGNOSIS — K58.0 IRRITABLE BOWEL SYNDROME WITH DIARRHEA: ICD-10-CM

## 2022-11-28 DIAGNOSIS — M79.605 BILATERAL LEG PAIN: ICD-10-CM

## 2022-11-28 DIAGNOSIS — R73.01 IMPAIRED FASTING GLUCOSE: ICD-10-CM

## 2022-11-28 PROCEDURE — 3078F DIAST BP <80 MM HG: CPT | Performed by: FAMILY MEDICINE

## 2022-11-28 PROCEDURE — 3074F SYST BP LT 130 MM HG: CPT | Performed by: FAMILY MEDICINE

## 2022-11-28 PROCEDURE — 99214 OFFICE O/P EST MOD 30 MIN: CPT | Performed by: FAMILY MEDICINE

## 2022-11-28 PROCEDURE — 1123F ACP DISCUSS/DSCN MKR DOCD: CPT | Performed by: FAMILY MEDICINE

## 2022-11-28 RX ORDER — CETIRIZINE HYDROCHLORIDE 10 MG/1
10 TABLET ORAL DAILY
COMMUNITY

## 2022-11-28 RX ORDER — LEVOTHYROXINE SODIUM 0.1 MG/1
TABLET ORAL
Qty: 90 TABLET | Refills: 1 | Status: SHIPPED | OUTPATIENT
Start: 2022-11-28

## 2022-11-28 ASSESSMENT — ENCOUNTER SYMPTOMS
CONSTIPATION: 0
NAUSEA: 0
SHORTNESS OF BREATH: 0
COUGH: 0
WHEEZING: 0
ABDOMINAL PAIN: 1
VOMITING: 0
BACK PAIN: 1
DIARRHEA: 1

## 2022-11-28 NOTE — PROGRESS NOTES
22  Parul Whitehead : 1950 Sex: female  Age: 67 y.o. Chief Complaint   Patient presents with    Hypertension    Leg Pain     Bilateral on and off for years      HPI:  67 y.o. female presents today for 6 month follow up of chronic medical conditions, medication refills and FBW results. Patient's chart, medical, surgical and medication history all reviewed. Hypertension   The patient presents today for follow up of HTN. The problem is borderline controlled. Risk factors for coronary artery disease include Age > 27, HTN and elevated cholesterol. Current treatments include diltiazem (Cardizem) and hydrochlorothiazide (HCTZ). The patient is compliant all of the time. Lifestyle changes the patient has made include  none . Today the patient is complaining of none. Hypothyroidism  Patient presents for routine follow up of Hypothyroidism. Current symptoms: diarrhea and hot flashes . Patient denies change in energy level, heat / cold intolerance, nervousness, palpitations and weight changes. Symptoms have been well-controlled. No difficulty swallowing or masses felt. Last TSH was 0.592. Patient is currently taking levothyroxine 100 mcg daily with 2 tablets on sundays. Hyperlipidemia  The 10-year ASCVD risk score (Beba DK, et al., 2019) is: 21.3%    Values used to calculate the score:      Age: 67 years      Sex: Female      Is Non- : No      Diabetic: No      Tobacco smoker: No      Systolic Blood Pressure: 371 mmHg      Is BP treated: Yes      HDL Cholesterol: 53 mg/dL      Total Cholesterol: 198 mg/dL  Having aching in her legs. Has been ongoing for years, but getting worse. ROS:  Review of Systems   Constitutional:  Negative for chills, fatigue and fever. Respiratory:  Negative for cough, shortness of breath and wheezing. Cardiovascular:  Negative for chest pain and palpitations.    Gastrointestinal:  Positive for abdominal pain (intermittent) and diarrhea. Negative for constipation, nausea and vomiting. Endocrine:        Hot flashes   Musculoskeletal:  Positive for arthralgias and back pain. Negative for gait problem. Skin:  Negative for rash. Neurological:  Negative for dizziness and headaches. Psychiatric/Behavioral:  Negative for dysphoric mood. The patient is not nervous/anxious. All other systems reviewed and are negative. Current Outpatient Medications on File Prior to Visit   Medication Sig Dispense Refill    cetirizine (ZYRTEC) 10 MG tablet Take 10 mg by mouth daily      DILT- MG extended release capsule TAKE ONE CAPSULE BY MOUTH DAILY 90 capsule 1    lisinopril-hydroCHLOROthiazide (PRINZIDE;ZESTORETIC) 20-25 MG per tablet TAKE ONE TABLET BY MOUTH DAILY 90 tablet 1    pantoprazole (PROTONIX) 40 MG tablet Take 40 mg by mouth daily      ipratropium (ATROVENT) 0.06 % nasal spray 2 sprays by Each Nostril route 4 times daily 15 mL 0    fluticasone (FLONASE) 50 MCG/ACT nasal spray 2 sprays by Nasal route daily 16 g 11    ezetimibe (ZETIA) 10 MG tablet TAKE 1 TABLET BY MOUTH 3 TIMES A WEEK 36 tablet 3    dicyclomine (BENTYL) 10 MG capsule Take 1 capsule by mouth 4 times daily 360 capsule 1    famotidine (PEPCID) 20 MG tablet Take 1 tablet by mouth 2 times daily 180 tablet 1    meclizine (ANTIVERT) 25 MG tablet Take 1 tablet by mouth 3 times daily as needed for Dizziness 270 tablet 0    Calcium Carbonate-Vit D-Min (CALCIUM 1200) 3800-4273 MG-UNIT CHEW       Cholecalciferol (VITAMIN D3) 1.25 MG (40019 UT) CAPS Take by mouth      aspirin 81 MG tablet Take 81 mg by mouth daily Ld 3/21/2019 per dr. Jacques Gaytan       No current facility-administered medications on file prior to visit.        No Known Allergies    Past Medical History:   Diagnosis Date    Acquired hypothyroidism 12/30/2017    Cataract, left eye     Essential hypertension 12/30/2017    Hyperlipidemia     Hypertension     Lumbar spinal stenosis     Radiculitis of left cervical region 2017    Thyroid disease      Past Surgical History:   Procedure Laterality Date    CATARACT REMOVAL WITH IMPLANT Right 2016     SECTION      x 2    COLONOSCOPY      INTRACAPSULAR CATARACT EXTRACTION Left 2019    LEFT EYE CATARACT EXTRACTION WITH  IOL IMPLANT performed by Mauricio Hammond MD at [address]       Family History   Problem Relation Age of Onset    Heart Disease Mother     Other Father         sepsis from TKA    COPD Father     Hypothyroidism Father     Heart Attack Maternal Grandmother     Heart Attack Maternal Grandfather     Cancer Maternal Aunt         Hodgkin lymphoma    No Known Problems Brother      Social History     Socioeconomic History    Marital status:      Spouse name: Not on file    Number of children: Not on file    Years of education: Not on file    Highest education level: Not on file   Occupational History    Not on file   Tobacco Use    Smoking status: Former     Packs/day: 0.50     Years: 40.00     Pack years: 20.00     Types: Cigarettes     Start date:      Quit date: 2011     Years since quittin.8    Smokeless tobacco: Never   Vaping Use    Vaping Use: Never used   Substance and Sexual Activity    Alcohol use: Yes     Comment: weekends / 2 glasses wine    Drug use: No    Sexual activity: Not on file   Other Topics Concern    Not on file   Social History Narrative    Not on file     Social Determinants of Health     Financial Resource Strain: Low Risk     Difficulty of Paying Living Expenses: Not hard at all   Food Insecurity: No Food Insecurity    Worried About Running Out of Food in the Last Year: Never true    Ran Out of Food in the Last Year: Never true   Transportation Needs: Not on file   Physical Activity: Inactive    Days of Exercise per Week: 0 days    Minutes of Exercise per Session: 0 min   Stress: Not on file   Social Connections: Not on file   Intimate Partner Violence: Not on file   Housing Stability: Not on file       Vitals:    11/28/22 0807   BP: (!) 144/62   Pulse: 86   Temp: 97.8 °F (36.6 °C)   SpO2: 96%   Weight: 139 lb (63 kg)   Height: 4' 11\" (1.499 m)       Physical Exam:  Physical Exam  Vitals and nursing note reviewed. Constitutional:       General: She is not in acute distress. Appearance: Normal appearance. She is well-developed and normal weight. She is not ill-appearing. HENT:      Head: Normocephalic and atraumatic. Right Ear: Hearing and external ear normal.      Left Ear: Hearing and external ear normal.      Nose:      Comments: Wearing mask  Eyes:      General: Lids are normal. No scleral icterus. Extraocular Movements: Extraocular movements intact. Conjunctiva/sclera: Conjunctivae normal.   Neck:      Thyroid: No thyromegaly. Vascular: No carotid bruit. Cardiovascular:      Rate and Rhythm: Normal rate and regular rhythm. Heart sounds: Normal heart sounds. No murmur heard. Pulmonary:      Effort: Pulmonary effort is normal. No respiratory distress. Breath sounds: Normal breath sounds. No wheezing. Musculoskeletal:         General: No tenderness or deformity. Normal range of motion. Cervical back: Normal range of motion and neck supple. Right lower leg: No edema. Left lower leg: No edema. Lymphadenopathy:      Cervical: No cervical adenopathy. Skin:     General: Skin is warm and dry. Findings: No rash. Neurological:      General: No focal deficit present. Mental Status: She is alert and oriented to person, place, and time. Gait: Gait normal.   Psychiatric:         Mood and Affect: Mood and affect normal.         Speech: Speech normal.         Behavior: Behavior normal.         Thought Content:  Thought content normal.       Labs:  CBC with Differential:    Lab Results   Component Value Date/Time    WBC 8.2 11/23/2022 08:01 AM    RBC 4.33 11/23/2022 08:01 AM    HGB 13.5 11/23/2022 08:01 AM    HCT 41.7 11/23/2022 08:01 AM     11/23/2022 08:01 AM    MCV 96.3 11/23/2022 08:01 AM    MCH 31.2 11/23/2022 08:01 AM    MCHC 32.4 11/23/2022 08:01 AM    RDW 12.5 11/23/2022 08:01 AM    LYMPHOPCT 49.0 11/23/2022 08:01 AM    MONOPCT 6.3 11/23/2022 08:01 AM    BASOPCT 0.2 11/23/2022 08:01 AM    MONOSABS 0.52 11/23/2022 08:01 AM    LYMPHSABS 4.03 11/23/2022 08:01 AM    EOSABS 0.09 11/23/2022 08:01 AM    BASOSABS 0.02 11/23/2022 08:01 AM     CMP:    Lab Results   Component Value Date/Time     11/23/2022 08:01 AM    K 4.4 11/23/2022 08:01 AM     11/23/2022 08:01 AM    CO2 26 11/23/2022 08:01 AM    BUN 26 11/23/2022 08:01 AM    CREATININE 1.0 11/23/2022 08:01 AM    GFRAA >60 03/22/2022 08:22 AM    LABGLOM 59 11/23/2022 08:01 AM    GLUCOSE 98 11/23/2022 08:01 AM    PROT 6.6 11/23/2022 08:01 AM    LABALBU 4.1 11/23/2022 08:01 AM    CALCIUM 9.4 11/23/2022 08:01 AM    BILITOT 0.3 11/23/2022 08:01 AM    ALKPHOS 80 11/23/2022 08:01 AM    AST 19 11/23/2022 08:01 AM    ALT 15 11/23/2022 08:01 AM     Uric Acid:    Lab Results   Component Value Date/Time    LABURIC 6.2 11/23/2022 08:01 AM     HgBA1c:    Lab Results   Component Value Date/Time    LABA1C 5.6 11/23/2022 08:01 AM     Microalbumen/Creatinine ratio:  No components found for: RUCREAT  FLP:    Lab Results   Component Value Date/Time    TRIG 255 11/23/2022 08:01 AM    HDL 53 11/23/2022 08:01 AM    LDLCALC 94 11/23/2022 08:01 AM    LABVLDL 51 11/23/2022 08:01 AM     TSH:    Lab Results   Component Value Date/Time    TSH 0.593 11/23/2022 08:01 AM        Assessment and Plan:  Franca Mccauley was seen today for hypertension and leg pain. Diagnoses and all orders for this visit:    Essential hypertension  -     CBC with Auto Differential; Future  -     Comprehensive Metabolic Panel; Future  -     Lipid Panel; Future  -     TSH; Future  -     Urinalysis; Future  -     T4, Free; Future  -     Uric Acid; Future  Improved at this time, but still borderline. Will monitor.   Labs reviewed in detail. Repeat in 6 months. Mixed hyperlipidemia  -     Lipid Panel; Future  Leg pains may be related to statin. Will stop Lipitor- see if aches improve. Recheck lipids in 6 months and will need to add crestor if labs bad/aches better. Acquired hypothyroidism  -     levothyroxine (SYNTHROID) 100 MCG tablet; TAKE ONE TABLET BY MOUTH DAILY  Well controlled    Irritable bowel syndrome with diarrhea  Getting EGD/Colonoscopy in the coming months with Dr. Lu Lindsey. Started on Protonix    Bilateral leg pain  -     XR HIP BILATERAL W AP PELVIS (2 VIEWS); Future  R/O OA of the hips as cause of achiness. Impaired fasting glucose  -     Hemoglobin A1C; Future    Vitamin D insufficiency  -     Vitamin D 25 Hydroxy; Future        Return in about 6 months (around 5/28/2023), or if symptoms worsen or fail to improve, for AWV.       Seen By:  Yuli Colon DO

## 2022-12-01 DIAGNOSIS — F51.01 PRIMARY INSOMNIA: ICD-10-CM

## 2022-12-01 RX ORDER — TEMAZEPAM 15 MG/1
CAPSULE ORAL
Qty: 90 CAPSULE | Refills: 0 | Status: SHIPPED | OUTPATIENT
Start: 2022-12-01 | End: 2023-03-01

## 2022-12-14 ENCOUNTER — HOSPITAL ENCOUNTER (OUTPATIENT)
Age: 72
Discharge: HOME | End: 2022-12-16

## 2022-12-15 LAB — CLOTEST: NORMAL

## 2023-01-23 ENCOUNTER — HOSPITAL ENCOUNTER (OUTPATIENT)
Age: 73
Discharge: HOME | End: 2023-01-25

## 2023-01-23 PROCEDURE — 88305 TISSUE EXAM BY PATHOLOGIST: CPT

## 2023-03-10 ENCOUNTER — OFFICE VISIT (OUTPATIENT)
Dept: FAMILY MEDICINE CLINIC | Age: 73
End: 2023-03-10

## 2023-03-10 VITALS
BODY MASS INDEX: 27.91 KG/M2 | TEMPERATURE: 97.5 F | DIASTOLIC BLOOD PRESSURE: 74 MMHG | HEART RATE: 88 BPM | OXYGEN SATURATION: 97 % | WEIGHT: 138.2 LBS | SYSTOLIC BLOOD PRESSURE: 138 MMHG

## 2023-03-10 DIAGNOSIS — R09.81 NASAL CONGESTION: ICD-10-CM

## 2023-03-10 DIAGNOSIS — R09.82 POSTNASAL DRIP: ICD-10-CM

## 2023-03-10 DIAGNOSIS — J06.9 ACUTE UPPER RESPIRATORY INFECTION, UNSPECIFIED: Primary | ICD-10-CM

## 2023-03-10 DIAGNOSIS — R05.9 COUGH, UNSPECIFIED TYPE: ICD-10-CM

## 2023-03-10 DIAGNOSIS — J01.90 ACUTE NON-RECURRENT SINUSITIS, UNSPECIFIED LOCATION: ICD-10-CM

## 2023-03-10 LAB
Lab: NORMAL
PERFORMING INSTRUMENT: NORMAL
QC PASS/FAIL: NORMAL
SARS-COV-2, POC: NORMAL

## 2023-03-10 RX ORDER — DOXYCYCLINE HYCLATE 100 MG
100 TABLET ORAL 2 TIMES DAILY
Qty: 20 TABLET | Refills: 0 | Status: SHIPPED | OUTPATIENT
Start: 2023-03-10 | End: 2023-03-20

## 2023-03-10 NOTE — PROGRESS NOTES
3/10/23  [facility] : 1950 Sex: female  Age 67 y.o. Subjective:  Chief Complaint   Patient presents with    Cough    Congestion         HPI:   Mine Moreno , 67 y.o. female presents to express care for evaluation of cough, congestion    HPI  69-year-old female presents to express care for evaluation of cough, congestion, drainage. The patient is actually here with  who is being evaluated for the same. The patient has not had any fever, chills. No myalgias. The patient has had increased congestion and coughing.  is just now starting to cough she has more of the cough and congestion started about a day after he started. The patient is not having any fevers. They recently traveled to [facility]:   Unless otherwise stated in this report the patient's positive and negative responses for review of systems for constitutional, eyes, ENT, cardiovascular, respiratory, gastrointestinal, neurological, , musculoskeletal, and integument systems and related systems to the presenting problem are either stated in the history of present illness or were not pertinent or were negative for the symptoms and/or complaints related to the presenting medical problem. Positives and pertinent negatives as per HPI. All others reviewed and are negative.       PMH:     Past Medical History:   Diagnosis Date    Acquired hypothyroidism 2017    Cataract, left eye     Essential hypertension 2017    Hyperlipidemia     Hypertension     Lumbar spinal stenosis     Radiculitis of left cervical region 2017    Thyroid disease        Past Surgical History:   Procedure Laterality Date    CATARACT REMOVAL WITH IMPLANT Right 2016     SECTION      x 2    COLONOSCOPY      INTRACAPSULAR CATARACT EXTRACTION Left 2019    LEFT EYE CATARACT EXTRACTION WITH  IOL IMPLANT performed by Mili Ireland MD at [address]         Family History   Problem Relation Age of Onset    Heart Disease Mother     Other Father         sepsis from TKA    COPD Father     Hypothyroidism Father     Heart Attack Maternal Grandmother     Heart Attack Maternal Grandfather     Cancer Maternal Aunt         Hodgkin lymphoma    No Known Problems Brother        Medications:     Current Outpatient Medications:     doxycycline hyclate (VIBRA-TABS) 100 MG tablet, Take 1 tablet by mouth 2 times daily for 10 days, Disp: 20 tablet, Rfl: 0    cetirizine (ZYRTEC) 10 MG tablet, Take 10 mg by mouth daily, Disp: , Rfl:     levothyroxine (SYNTHROID) 100 MCG tablet, TAKE ONE TABLET BY MOUTH DAILY, Disp: 90 tablet, Rfl: 1    DILT- MG extended release capsule, TAKE ONE CAPSULE BY MOUTH DAILY, Disp: 90 capsule, Rfl: 1    lisinopril-hydroCHLOROthiazide (PRINZIDE;ZESTORETIC) 20-25 MG per tablet, TAKE ONE TABLET BY MOUTH DAILY, Disp: 90 tablet, Rfl: 1    pantoprazole (PROTONIX) 40 MG tablet, Take 40 mg by mouth daily, Disp: , Rfl:     ipratropium (ATROVENT) 0.06 % nasal spray, 2 sprays by Each Nostril route 4 times daily, Disp: 15 mL, Rfl: 0    fluticasone (FLONASE) 50 MCG/ACT nasal spray, 2 sprays by Nasal route daily, Disp: 16 g, Rfl: 11    ezetimibe (ZETIA) 10 MG tablet, TAKE 1 TABLET BY MOUTH 3 TIMES A WEEK, Disp: 36 tablet, Rfl: 3    dicyclomine (BENTYL) 10 MG capsule, Take 1 capsule by mouth 4 times daily, Disp: 360 capsule, Rfl: 1    famotidine (PEPCID) 20 MG tablet, Take 1 tablet by mouth 2 times daily, Disp: 180 tablet, Rfl: 1    meclizine (ANTIVERT) 25 MG tablet, Take 1 tablet by mouth 3 times daily as needed for Dizziness, Disp: 270 tablet, Rfl: 0    Calcium Carbonate-Vit D-Min (CALCIUM 1200) 9262-2586 MG-UNIT CHEW, , Disp: , Rfl:     Cholecalciferol (VITAMIN D3) 1.25 MG (59776 UT) CAPS, Take by mouth, Disp: , Rfl:     aspirin 81 MG tablet, Take 81 mg by mouth daily Ld 3/21/2019 per dr. Samaria Mckee, Disp: , Rfl:     Allergies:   No Known Allergies    Social History:     Social History Tobacco Use    Smoking status: Former     Packs/day: 0.50     Years: 40.00     Pack years: 20.00     Types: Cigarettes     Start date:      Quit date: 2011     Years since quittin.1    Smokeless tobacco: Never   Vaping Use    Vaping Use: Never used   Substance Use Topics    Alcohol use: Yes     Comment: weekends / 2 glasses wine    Drug use: No       Patient lives at home. Physical Exam:     Vitals:    03/10/23 0816   BP: 138/74   Site: Right Upper Arm   Position: Sitting   Pulse: 88   Temp: 97.5 °F (36.4 °C)   TempSrc: Temporal   SpO2: 97%   Weight: 138 lb 3.2 oz (62.7 kg)       Exam:  Physical Exam  Nurse's notes and vital signs reviewed. The patient is not hypoxic. ? General: Alert, no acute distress, patient resting comfortably Patient is not toxic or lethargic. Skin: Warm, intact, no pallor noted. There is no evidence of rash at this time. Head: Normocephalic, atraumatic  Eye: Normal conjunctiva  Ears, Nose, Throat: Right tympanic membrane clear, left tympanic membrane clear. No drainage or discharge noted. No pre- or post-auricular tenderness, erythema, or swelling noted. Nasal congestion, rhinorrhea  Posterior oropharynx shows erythema but no evidence of tonsillar hypertrophy, or exudate. the uvula is midline. No trismus or drooling is noted. Moist mucous membranes. Cardiovascular: Regular Rate and Rhythm  Respiratory: No acute distress, no rhonchi, wheezing or crackles noted. No stridor or retractions are noted. Neurological: A&O x4, normal speech  Psychiatric: Cooperative       Testing:       Results for orders placed or performed in visit on 03/10/23   POCT COVID-19, Antigen   Result Value Ref Range    SARS-COV-2, POC Not-Detected Not Detected    Lot Number [id number]     QC Pass/Fail pass     Performing Instrument BD Veritor          Medical Decision Making:     Vital signs reviewed    Past medical history reviewed. Allergies reviewed. Medications reviewed.     Patient on arrival does not appear to be in any apparent distress or discomfort. The patient has been seen and evaluated. The patient does not appear to be toxic or lethargic. COVID-negative    We will treat the patient with doxycycline.  was negative as well    The patient was educated on the proper dosage of motrin and tylenol and the appropriate intervals of each. The patient is to increase fluid intake over the next several days. The patient is to use OTC decongestant as needed. The patient is to return to express care or go directly to the emergency department should any of the signs or symptoms worsen. The patient is to followup with primary care physician in 2-3 days for repeat evaluation. The patient has no other questions or concerns at this time the patient will be discharged home. Clinical Impression:   Nola Fabian was seen today for cough and congestion. Diagnoses and all orders for this visit:    Acute upper respiratory infection, unspecified    Cough, unspecified type  -     POCT COVID-19, Antigen    Acute non-recurrent sinusitis, unspecified location    Nasal congestion    Postnasal drip    Other orders  -     doxycycline hyclate (VIBRA-TABS) 100 MG tablet; Take 1 tablet by mouth 2 times daily for 10 days      The patient is to call for any concerns or return if any of the signs or symptoms worsen. The patient is to follow-up with PCP in the next 2-3 days for repeat evaluation repeat assessment or go directly to the emergency department.      SIGNATURE: Genet Enciso III, PA-C

## 2023-04-30 DIAGNOSIS — E03.9 ACQUIRED HYPOTHYROIDISM: ICD-10-CM

## 2023-04-30 DIAGNOSIS — I10 ESSENTIAL HYPERTENSION: ICD-10-CM

## 2023-05-01 RX ORDER — LEVOTHYROXINE SODIUM 0.1 MG/1
TABLET ORAL
Qty: 90 TABLET | Refills: 0 | Status: SHIPPED | OUTPATIENT
Start: 2023-05-01

## 2023-05-01 RX ORDER — LISINOPRIL AND HYDROCHLOROTHIAZIDE 25; 20 MG/1; MG/1
TABLET ORAL
Qty: 90 TABLET | Refills: 0 | Status: SHIPPED | OUTPATIENT
Start: 2023-05-01

## 2023-05-15 DIAGNOSIS — E03.9 ACQUIRED HYPOTHYROIDISM: ICD-10-CM

## 2023-05-15 DIAGNOSIS — I10 ESSENTIAL HYPERTENSION: ICD-10-CM

## 2023-05-15 RX ORDER — LISINOPRIL AND HYDROCHLOROTHIAZIDE 25; 20 MG/1; MG/1
TABLET ORAL
Qty: 90 TABLET | Refills: 1 | Status: SHIPPED | OUTPATIENT
Start: 2023-05-15

## 2023-05-15 RX ORDER — LEVOTHYROXINE SODIUM 0.1 MG/1
TABLET ORAL
Qty: 90 TABLET | Refills: 1 | Status: SHIPPED | OUTPATIENT
Start: 2023-05-15

## 2023-05-15 RX ORDER — DILTIAZEM HYDROCHLORIDE 240 MG/1
CAPSULE, EXTENDED RELEASE ORAL
Qty: 90 CAPSULE | Refills: 1 | Status: SHIPPED | OUTPATIENT
Start: 2023-05-15

## 2023-06-05 DIAGNOSIS — I10 ESSENTIAL HYPERTENSION: ICD-10-CM

## 2023-06-05 DIAGNOSIS — E55.9 VITAMIN D INSUFFICIENCY: ICD-10-CM

## 2023-06-05 DIAGNOSIS — R73.01 IMPAIRED FASTING GLUCOSE: ICD-10-CM

## 2023-06-05 DIAGNOSIS — E78.2 MIXED HYPERLIPIDEMIA: ICD-10-CM

## 2023-06-05 LAB
ALBUMIN SERPL-MCNC: 4.3 G/DL (ref 3.5–5.2)
ALP SERPL-CCNC: 79 U/L (ref 35–104)
ALT SERPL-CCNC: 11 U/L (ref 0–32)
ANION GAP SERPL CALCULATED.3IONS-SCNC: 14 MMOL/L (ref 7–16)
AST SERPL-CCNC: 20 U/L (ref 0–31)
BASOPHILS # BLD: 0.03 E9/L (ref 0–0.2)
BASOPHILS NFR BLD: 0.4 % (ref 0–2)
BILIRUB SERPL-MCNC: 0.3 MG/DL (ref 0–1.2)
BUN SERPL-MCNC: 30 MG/DL (ref 6–23)
CALCIUM SERPL-MCNC: 9.8 MG/DL (ref 8.6–10.2)
CHLORIDE SERPL-SCNC: 102 MMOL/L (ref 98–107)
CHOLESTEROL, TOTAL: 248 MG/DL (ref 0–199)
CO2 SERPL-SCNC: 24 MMOL/L (ref 22–29)
CREAT SERPL-MCNC: 1 MG/DL (ref 0.5–1)
EOSINOPHIL # BLD: 0.11 E9/L (ref 0.05–0.5)
EOSINOPHIL NFR BLD: 1.3 % (ref 0–6)
ERYTHROCYTE [DISTWIDTH] IN BLOOD BY AUTOMATED COUNT: 12.4 FL (ref 11.5–15)
GLUCOSE SERPL-MCNC: 110 MG/DL (ref 74–99)
HBA1C MFR BLD: 5.4 % (ref 4–5.6)
HCT VFR BLD AUTO: 41.3 % (ref 34–48)
HDLC SERPL-MCNC: 58 MG/DL
HGB BLD-MCNC: 13.3 G/DL (ref 11.5–15.5)
IMM GRANULOCYTES # BLD: 0.02 E9/L
IMM GRANULOCYTES NFR BLD: 0.2 % (ref 0–5)
LDLC SERPL CALC-MCNC: 159 MG/DL (ref 0–99)
LYMPHOCYTES # BLD: 4.4 E9/L (ref 1.5–4)
LYMPHOCYTES NFR BLD: 51.3 % (ref 20–42)
MCH RBC QN AUTO: 31.2 PG (ref 26–35)
MCHC RBC AUTO-ENTMCNC: 32.2 % (ref 32–34.5)
MCV RBC AUTO: 96.9 FL (ref 80–99.9)
MONOCYTES # BLD: 0.55 E9/L (ref 0.1–0.95)
MONOCYTES NFR BLD: 6.4 % (ref 2–12)
NEUTROPHILS # BLD: 3.46 E9/L (ref 1.8–7.3)
NEUTS SEG NFR BLD: 40.4 % (ref 43–80)
PLATELET # BLD AUTO: 230 E9/L (ref 130–450)
PMV BLD AUTO: 11.9 FL (ref 7–12)
POTASSIUM SERPL-SCNC: 4.2 MMOL/L (ref 3.5–5)
PROT SERPL-MCNC: 6.7 G/DL (ref 6.4–8.3)
RBC # BLD AUTO: 4.26 E12/L (ref 3.5–5.5)
SODIUM SERPL-SCNC: 140 MMOL/L (ref 132–146)
T4 FREE SERPL-MCNC: 1.99 NG/DL (ref 0.93–1.7)
TRIGL SERPL-MCNC: 157 MG/DL (ref 0–149)
TSH SERPL-MCNC: 0.56 UIU/ML (ref 0.27–4.2)
URATE SERPL-MCNC: 8.3 MG/DL (ref 2.4–5.7)
VITAMIN D 25-HYDROXY: 56 NG/ML (ref 30–100)
VLDLC SERPL CALC-MCNC: 31 MG/DL
WBC # BLD: 8.6 E9/L (ref 4.5–11.5)

## 2023-06-13 PROBLEM — N18.30 CHRONIC RENAL DISEASE, STAGE III (HCC): Status: ACTIVE | Noted: 2023-06-13

## 2023-06-15 DIAGNOSIS — F51.01 PRIMARY INSOMNIA: ICD-10-CM

## 2023-06-15 RX ORDER — TEMAZEPAM 15 MG/1
CAPSULE ORAL
Qty: 90 CAPSULE | Refills: 1 | Status: SHIPPED | OUTPATIENT
Start: 2023-06-15 | End: 2023-09-13

## 2023-10-02 ENCOUNTER — OFFICE VISIT (OUTPATIENT)
Dept: FAMILY MEDICINE CLINIC | Age: 73
End: 2023-10-02
Payer: MEDICARE

## 2023-10-02 VITALS
HEART RATE: 89 BPM | OXYGEN SATURATION: 97 % | TEMPERATURE: 97.6 F | DIASTOLIC BLOOD PRESSURE: 80 MMHG | WEIGHT: 141 LBS | HEIGHT: 59 IN | SYSTOLIC BLOOD PRESSURE: 160 MMHG | BODY MASS INDEX: 28.43 KG/M2

## 2023-10-02 DIAGNOSIS — R05.9 COUGH, UNSPECIFIED TYPE: ICD-10-CM

## 2023-10-02 DIAGNOSIS — J06.9 UPPER RESPIRATORY TRACT INFECTION, UNSPECIFIED TYPE: Primary | ICD-10-CM

## 2023-10-02 DIAGNOSIS — R09.81 NASAL CONGESTION: ICD-10-CM

## 2023-10-02 LAB
INFLUENZA A ANTIBODY: NORMAL
INFLUENZA B ANTIBODY: NORMAL
Lab: NORMAL
PERFORMING INSTRUMENT: NORMAL
QC PASS/FAIL: NORMAL
SARS-COV-2, POC: NORMAL

## 2023-10-02 PROCEDURE — 87804 INFLUENZA ASSAY W/OPTIC: CPT | Performed by: FAMILY MEDICINE

## 2023-10-02 PROCEDURE — 3079F DIAST BP 80-89 MM HG: CPT | Performed by: FAMILY MEDICINE

## 2023-10-02 PROCEDURE — 87426 SARSCOV CORONAVIRUS AG IA: CPT | Performed by: FAMILY MEDICINE

## 2023-10-02 PROCEDURE — 3077F SYST BP >= 140 MM HG: CPT | Performed by: FAMILY MEDICINE

## 2023-10-02 PROCEDURE — 1123F ACP DISCUSS/DSCN MKR DOCD: CPT | Performed by: FAMILY MEDICINE

## 2023-10-02 PROCEDURE — 99213 OFFICE O/P EST LOW 20 MIN: CPT | Performed by: FAMILY MEDICINE

## 2023-10-02 RX ORDER — TEMAZEPAM 15 MG/1
CAPSULE ORAL
COMMUNITY
Start: 2023-09-16

## 2023-10-02 RX ORDER — AZITHROMYCIN 250 MG/1
TABLET, FILM COATED ORAL
Qty: 6 TABLET | Refills: 0 | Status: SHIPPED | OUTPATIENT
Start: 2023-10-02 | End: 2023-10-07

## 2023-10-02 RX ORDER — FAMOTIDINE 40 MG/1
40 TABLET, FILM COATED ORAL DAILY
COMMUNITY
Start: 2023-09-18

## 2023-10-02 RX ORDER — PREDNISONE 10 MG/1
TABLET ORAL
Qty: 30 TABLET | Refills: 0 | Status: SHIPPED | OUTPATIENT
Start: 2023-10-02 | End: 2023-10-13

## 2023-10-02 SDOH — ECONOMIC STABILITY: HOUSING INSECURITY
IN THE LAST 12 MONTHS, WAS THERE A TIME WHEN YOU DID NOT HAVE A STEADY PLACE TO SLEEP OR SLEPT IN A SHELTER (INCLUDING NOW)?: NO

## 2023-10-02 SDOH — ECONOMIC STABILITY: FOOD INSECURITY: WITHIN THE PAST 12 MONTHS, THE FOOD YOU BOUGHT JUST DIDN'T LAST AND YOU DIDN'T HAVE MONEY TO GET MORE.: NEVER TRUE

## 2023-10-02 SDOH — ECONOMIC STABILITY: FOOD INSECURITY: WITHIN THE PAST 12 MONTHS, YOU WORRIED THAT YOUR FOOD WOULD RUN OUT BEFORE YOU GOT MONEY TO BUY MORE.: NEVER TRUE

## 2023-10-02 SDOH — ECONOMIC STABILITY: INCOME INSECURITY: HOW HARD IS IT FOR YOU TO PAY FOR THE VERY BASICS LIKE FOOD, HOUSING, MEDICAL CARE, AND HEATING?: NOT HARD AT ALL

## 2023-10-02 ASSESSMENT — ENCOUNTER SYMPTOMS
COUGH: 1
DIARRHEA: 0
ABDOMINAL PAIN: 0
SINUS PRESSURE: 0
BACK PAIN: 0
RHINORRHEA: 0
NAUSEA: 0
SORE THROAT: 1
WHEEZING: 0
VOMITING: 0
SHORTNESS OF BREATH: 0
SINUS PAIN: 0
EYE DISCHARGE: 0
CONSTIPATION: 0

## 2023-10-02 NOTE — PROGRESS NOTES
normal. No respiratory distress. Breath sounds: Normal breath sounds. No wheezing. Musculoskeletal:         General: Normal range of motion. Cervical back: Normal range of motion and neck supple. No tenderness. Right lower leg: No edema. Left lower leg: No edema. Lymphadenopathy:      Cervical: No cervical adenopathy. Skin:     General: Skin is warm and dry. Findings: No rash. Neurological:      General: No focal deficit present. Mental Status: She is alert and oriented to person, place, and time. Gait: Gait normal.   Psychiatric:         Mood and Affect: Mood normal.         Behavior: Behavior normal.         Thought Content: Thought content normal.         Results for orders placed or performed in visit on 10/02/23   POCT COVID-19, Antigen   Result Value Ref Range    SARS-COV-2, POC Not-Detected Not Detected    Lot Number [id number]     QC Pass/Fail pass     Performing Instrument BD Veritor    POCT Influenza A/B   Result Value Ref Range    Influenza A Ab neg     Influenza B Ab neg        Assessment and Plan:  eBn Liz was seen today for cough, congestion and sore throat. Diagnoses and all orders for this visit:    Upper respiratory tract infection, unspecified type  -     azithromycin (ZITHROMAX) 250 MG tablet; Take 2 tablets by mouth daily for 1 day, THEN 1 tablet daily for 4 days. -     predniSONE (DELTASONE) 10 MG tablet; Take 4 tablets by mouth daily for 3 days, THEN 3 tablets daily for 3 days, THEN 2 tablets daily for 3 days, THEN 1 tablet daily for 3 days. Nasal congestion  -     POCT COVID-19, Antigen  -     POCT Influenza A/B    Cough, unspecified type  -     POCT COVID-19, Antigen  -     POCT Influenza A/B    Rapid COVID and flu are negative. BP likely elevated due to illness- improved on recheck. Patient will monitor. Will treat with Zpack and Prednisone. OK to continue Nasacort. Will call if any worsening.      Return if symptoms worsen or fail to

## 2023-10-10 RX ORDER — EZETIMIBE 10 MG/1
TABLET ORAL
Qty: 36 TABLET | Refills: 3 | Status: SHIPPED | OUTPATIENT
Start: 2023-10-10

## 2023-11-06 ENCOUNTER — NURSE ONLY (OUTPATIENT)
Dept: PRIMARY CARE CLINIC | Age: 73
End: 2023-11-06
Payer: MEDICARE

## 2023-11-06 DIAGNOSIS — Z23 NEED FOR INFLUENZA VACCINATION: Primary | ICD-10-CM

## 2023-11-06 PROCEDURE — G0008 ADMIN INFLUENZA VIRUS VAC: HCPCS | Performed by: FAMILY MEDICINE

## 2023-11-06 PROCEDURE — 90694 VACC AIIV4 NO PRSRV 0.5ML IM: CPT | Performed by: FAMILY MEDICINE

## 2023-11-11 DIAGNOSIS — I10 ESSENTIAL HYPERTENSION: ICD-10-CM

## 2023-11-13 RX ORDER — DILTIAZEM HYDROCHLORIDE 240 MG/1
CAPSULE, EXTENDED RELEASE ORAL
Qty: 90 CAPSULE | Refills: 0 | Status: SHIPPED | OUTPATIENT
Start: 2023-11-13

## 2023-12-09 DIAGNOSIS — J06.9 VIRAL URI: ICD-10-CM

## 2023-12-09 DIAGNOSIS — E03.9 ACQUIRED HYPOTHYROIDISM: ICD-10-CM

## 2023-12-11 DIAGNOSIS — E78.2 MIXED HYPERLIPIDEMIA: ICD-10-CM

## 2023-12-11 DIAGNOSIS — N18.31 STAGE 3A CHRONIC KIDNEY DISEASE (HCC): ICD-10-CM

## 2023-12-11 DIAGNOSIS — E03.9 ACQUIRED HYPOTHYROIDISM: ICD-10-CM

## 2023-12-11 DIAGNOSIS — Z00.00 MEDICARE ANNUAL WELLNESS VISIT, SUBSEQUENT: ICD-10-CM

## 2023-12-11 DIAGNOSIS — I10 ESSENTIAL HYPERTENSION: ICD-10-CM

## 2023-12-11 DIAGNOSIS — R73.01 IMPAIRED FASTING GLUCOSE: ICD-10-CM

## 2023-12-11 PROBLEM — M51.36 DEGENERATION OF INTERVERTEBRAL DISC OF LUMBAR REGION: Status: ACTIVE | Noted: 2023-12-11

## 2023-12-11 PROBLEM — M51.369 DEGENERATION OF INTERVERTEBRAL DISC OF LUMBAR REGION: Status: ACTIVE | Noted: 2023-12-11

## 2023-12-11 PROBLEM — M54.16 LUMBAR RADICULOPATHY: Status: ACTIVE | Noted: 2023-12-11

## 2023-12-11 LAB
ABSOLUTE IMMATURE GRANULOCYTE: <0.03 K/UL (ref 0–0.58)
ALBUMIN SERPL-MCNC: 4.2 G/DL (ref 3.5–5.2)
ALP BLD-CCNC: 80 U/L (ref 35–104)
ALT SERPL-CCNC: 8 U/L (ref 0–32)
ANION GAP SERPL CALCULATED.3IONS-SCNC: 17 MMOL/L (ref 7–16)
AST SERPL-CCNC: 20 U/L (ref 0–31)
BASOPHILS ABSOLUTE: 0.02 K/UL (ref 0–0.2)
BASOPHILS RELATIVE PERCENT: 0 % (ref 0–2)
BILIRUB SERPL-MCNC: 0.3 MG/DL (ref 0–1.2)
BILIRUBIN URINE: NEGATIVE
BUN BLDV-MCNC: 24 MG/DL (ref 6–23)
CALCIUM SERPL-MCNC: 9.5 MG/DL (ref 8.6–10.2)
CHLORIDE BLD-SCNC: 103 MMOL/L (ref 98–107)
CHOLESTEROL: 234 MG/DL
CO2: 24 MMOL/L (ref 22–29)
COLOR: YELLOW
CREAT SERPL-MCNC: 1 MG/DL (ref 0.5–1)
EOSINOPHILS ABSOLUTE: 0.12 K/UL (ref 0.05–0.5)
EOSINOPHILS RELATIVE PERCENT: 2 % (ref 0–6)
GFR SERPL CREATININE-BSD FRML MDRD: 58 ML/MIN/1.73M2
GLUCOSE BLD-MCNC: 99 MG/DL (ref 74–99)
GLUCOSE URINE: NEGATIVE MG/DL
HBA1C MFR BLD: 5.6 % (ref 4–5.6)
HCT VFR BLD CALC: 43.1 % (ref 34–48)
HDLC SERPL-MCNC: 57 MG/DL
HEMOGLOBIN: 13.7 G/DL (ref 11.5–15.5)
IMMATURE GRANULOCYTES: 0 % (ref 0–5)
KETONES, URINE: NEGATIVE MG/DL
LDL CHOLESTEROL: 130 MG/DL
LEUKOCYTE ESTERASE, URINE: ABNORMAL
LYMPHOCYTES ABSOLUTE: 3.93 K/UL (ref 1.5–4)
LYMPHOCYTES RELATIVE PERCENT: 51 % (ref 20–42)
MCH RBC QN AUTO: 31 PG (ref 26–35)
MCHC RBC AUTO-ENTMCNC: 31.8 G/DL (ref 32–34.5)
MCV RBC AUTO: 97.5 FL (ref 80–99.9)
MONOCYTES ABSOLUTE: 0.51 K/UL (ref 0.1–0.95)
MONOCYTES RELATIVE PERCENT: 7 % (ref 2–12)
NEUTROPHILS ABSOLUTE: 3.1 K/UL (ref 1.8–7.3)
NEUTROPHILS RELATIVE PERCENT: 40 % (ref 43–80)
NITRITE, URINE: NEGATIVE
PDW BLD-RTO: 12.4 % (ref 11.5–15)
PH UA: 6 (ref 5–9)
PLATELET # BLD: 226 K/UL (ref 130–450)
PMV BLD AUTO: 11.9 FL (ref 7–12)
POTASSIUM SERPL-SCNC: 4.2 MMOL/L (ref 3.5–5)
PROTEIN UA: NEGATIVE MG/DL
RBC # BLD: 4.42 M/UL (ref 3.5–5.5)
RBC UA: ABNORMAL /HPF
SODIUM BLD-SCNC: 144 MMOL/L (ref 132–146)
SPECIFIC GRAVITY UA: <1.005 (ref 1–1.03)
T4 FREE: 1.8 NG/DL (ref 0.9–1.7)
TOTAL PROTEIN: 6.8 G/DL (ref 6.4–8.3)
TRIGL SERPL-MCNC: 237 MG/DL
TSH SERPL DL<=0.05 MIU/L-ACNC: 0.23 UIU/ML (ref 0.27–4.2)
TURBIDITY: CLEAR
URIC ACID: 6.3 MG/DL (ref 2.4–5.7)
URINE HGB: NEGATIVE
UROBILINOGEN, URINE: 0.2 EU/DL (ref 0–1)
VITAMIN D 25-HYDROXY: 64.4 NG/ML (ref 30–100)
VLDLC SERPL CALC-MCNC: 47 MG/DL
WBC # BLD: 7.7 K/UL (ref 4.5–11.5)
WBC UA: ABNORMAL /HPF

## 2023-12-11 RX ORDER — LEVOTHYROXINE SODIUM 0.1 MG/1
TABLET ORAL
Qty: 90 TABLET | Refills: 0 | OUTPATIENT
Start: 2023-12-11

## 2023-12-11 RX ORDER — FLUTICASONE PROPIONATE 50 MCG
SPRAY, SUSPENSION (ML) NASAL
Qty: 16 G | Refills: 11 | Status: SHIPPED | OUTPATIENT
Start: 2023-12-11

## 2023-12-11 ASSESSMENT — ENCOUNTER SYMPTOMS
WHEEZING: 0
BACK PAIN: 1
VOMITING: 0
COUGH: 0
SHORTNESS OF BREATH: 0
ABDOMINAL PAIN: 1
NAUSEA: 0

## 2023-12-12 ENCOUNTER — OFFICE VISIT (OUTPATIENT)
Dept: PRIMARY CARE CLINIC | Age: 73
End: 2023-12-12
Payer: MEDICARE

## 2023-12-12 VITALS
TEMPERATURE: 97.4 F | HEART RATE: 88 BPM | WEIGHT: 138 LBS | OXYGEN SATURATION: 98 % | SYSTOLIC BLOOD PRESSURE: 146 MMHG | BODY MASS INDEX: 27.82 KG/M2 | DIASTOLIC BLOOD PRESSURE: 72 MMHG | HEIGHT: 59 IN

## 2023-12-12 DIAGNOSIS — N18.31 STAGE 3A CHRONIC KIDNEY DISEASE (HCC): ICD-10-CM

## 2023-12-12 DIAGNOSIS — K57.90 DIVERTICULOSIS: ICD-10-CM

## 2023-12-12 DIAGNOSIS — E03.9 ACQUIRED HYPOTHYROIDISM: ICD-10-CM

## 2023-12-12 DIAGNOSIS — E78.2 MIXED HYPERLIPIDEMIA: ICD-10-CM

## 2023-12-12 DIAGNOSIS — I10 ESSENTIAL HYPERTENSION: Primary | ICD-10-CM

## 2023-12-12 DIAGNOSIS — R73.01 IMPAIRED FASTING GLUCOSE: ICD-10-CM

## 2023-12-12 PROCEDURE — 1123F ACP DISCUSS/DSCN MKR DOCD: CPT | Performed by: FAMILY MEDICINE

## 2023-12-12 PROCEDURE — 99214 OFFICE O/P EST MOD 30 MIN: CPT | Performed by: FAMILY MEDICINE

## 2023-12-12 PROCEDURE — 3078F DIAST BP <80 MM HG: CPT | Performed by: FAMILY MEDICINE

## 2023-12-12 PROCEDURE — 3077F SYST BP >= 140 MM HG: CPT | Performed by: FAMILY MEDICINE

## 2023-12-12 RX ORDER — DILTIAZEM HYDROCHLORIDE 240 MG/1
240 CAPSULE, EXTENDED RELEASE ORAL DAILY
Qty: 90 CAPSULE | Refills: 1 | Status: SHIPPED | OUTPATIENT
Start: 2023-12-12

## 2023-12-12 RX ORDER — LISINOPRIL AND HYDROCHLOROTHIAZIDE 25; 20 MG/1; MG/1
1 TABLET ORAL DAILY
Qty: 90 TABLET | Refills: 1 | Status: SHIPPED | OUTPATIENT
Start: 2023-12-12

## 2023-12-12 RX ORDER — LEVOTHYROXINE SODIUM 0.1 MG/1
100 TABLET ORAL DAILY
Qty: 100 TABLET | Refills: 1 | Status: SHIPPED | OUTPATIENT
Start: 2023-12-12

## 2023-12-12 RX ORDER — ROSUVASTATIN CALCIUM 5 MG/1
5 TABLET, COATED ORAL DAILY
Qty: 90 TABLET | Refills: 1 | Status: SHIPPED | OUTPATIENT
Start: 2023-12-12

## 2023-12-12 ASSESSMENT — ENCOUNTER SYMPTOMS
CONSTIPATION: 1
DIARRHEA: 0

## 2023-12-12 NOTE — PROGRESS NOTES
present. Mental Status: She is alert and oriented to person, place, and time. Gait: Gait normal.   Psychiatric:         Mood and Affect: Mood and affect normal.         Speech: Speech normal.         Behavior: Behavior normal.         Thought Content:  Thought content normal.         Labs:  CBC with Differential:    Lab Results   Component Value Date/Time    WBC 7.7 12/11/2023 07:16 AM    RBC 4.42 12/11/2023 07:16 AM    HGB 13.7 12/11/2023 07:16 AM    HCT 43.1 12/11/2023 07:16 AM     12/11/2023 07:16 AM    MCV 97.5 12/11/2023 07:16 AM    MCH 31.0 12/11/2023 07:16 AM    MCHC 31.8 12/11/2023 07:16 AM    RDW 12.4 12/11/2023 07:16 AM    LYMPHOPCT 51 12/11/2023 07:16 AM    MONOPCT 7 12/11/2023 07:16 AM    BASOPCT 0 12/11/2023 07:16 AM    MONOSABS 0.51 12/11/2023 07:16 AM    LYMPHSABS 3.93 12/11/2023 07:16 AM    EOSABS 0.12 12/11/2023 07:16 AM    BASOSABS 0.02 12/11/2023 07:16 AM     CMP:    Lab Results   Component Value Date/Time     12/11/2023 07:16 AM    K 4.2 12/11/2023 07:16 AM     12/11/2023 07:16 AM    CO2 24 12/11/2023 07:16 AM    BUN 24 12/11/2023 07:16 AM    CREATININE 1.0 12/11/2023 07:16 AM    GFRAA >60 03/22/2022 08:22 AM    LABGLOM 58 12/11/2023 07:16 AM    GLUCOSE 99 12/11/2023 07:16 AM    PROT 6.8 12/11/2023 07:16 AM    LABALBU 4.2 12/11/2023 07:16 AM    CALCIUM 9.5 12/11/2023 07:16 AM    BILITOT 0.3 12/11/2023 07:16 AM    ALKPHOS 80 12/11/2023 07:16 AM    AST 20 12/11/2023 07:16 AM    ALT 8 12/11/2023 07:16 AM     Uric Acid:    Lab Results   Component Value Date/Time    LABURIC 8.3 06/05/2023 07:40 AM    URICACID 6.3 12/11/2023 07:16 AM     HgBA1c:    Lab Results   Component Value Date/Time    LABA1C 5.6 12/11/2023 07:16 AM     Microalbumen/Creatinine ratio:  No components found for: \"RUCREAT\"  FLP:    Lab Results   Component Value Date/Time    TRIG 237 12/11/2023 07:16 AM    HDL 57 12/11/2023 07:16 AM    LDLCALC 159 06/05/2023 07:40 AM    LABVLDL 31 06/05/2023 07:40 AM

## 2024-04-09 ENCOUNTER — OFFICE VISIT (OUTPATIENT)
Dept: FAMILY MEDICINE CLINIC | Age: 74
End: 2024-04-09

## 2024-04-09 VITALS
HEIGHT: 59 IN | SYSTOLIC BLOOD PRESSURE: 158 MMHG | TEMPERATURE: 97.8 F | BODY MASS INDEX: 27.82 KG/M2 | WEIGHT: 138 LBS | OXYGEN SATURATION: 98 % | HEART RATE: 89 BPM | DIASTOLIC BLOOD PRESSURE: 78 MMHG

## 2024-04-09 DIAGNOSIS — R09.82 POSTNASAL DRIP: ICD-10-CM

## 2024-04-09 DIAGNOSIS — J01.90 ACUTE NON-RECURRENT SINUSITIS, UNSPECIFIED LOCATION: Primary | ICD-10-CM

## 2024-04-09 DIAGNOSIS — J06.9 ACUTE UPPER RESPIRATORY INFECTION, UNSPECIFIED: ICD-10-CM

## 2024-04-09 RX ORDER — PREDNISONE 10 MG/1
TABLET ORAL
Qty: 18 TABLET | Refills: 0 | Status: SHIPPED | OUTPATIENT
Start: 2024-04-09

## 2024-04-09 RX ORDER — DOXYCYCLINE HYCLATE 100 MG
100 TABLET ORAL 2 TIMES DAILY
Qty: 20 TABLET | Refills: 0 | Status: SHIPPED | OUTPATIENT
Start: 2024-04-09 | End: 2024-04-19

## 2024-04-09 NOTE — PROGRESS NOTES
Rate and Rhythm  Respiratory: No acute distress, diminished lung sounds, no rhonchi or wheezing. No stridor or retractions are noted.  Neurological: A&O x4, normal speech  Psychiatric: Cooperative       Testing:     No results found for this visit on 04/09/24.        Medical Decision Making:     Vital signs reviewed    Past medical history reviewed.    Allergies reviewed.    Medications reviewed.    Patient on arrival does not appear to be in any apparent distress or discomfort.  The patient has been seen and evaluated.  The patient does not appear to be toxic or lethargic.     The patient will be treated with doxycycline, prednisone.    The patient was educated on the proper dosage of motrin and tylenol and the appropriate intervals of each. The patient is to increase fluid intake over the next several days. The patient is to use OTC decongestant as needed.     The patient is to return to express care or go directly to the emergency department should any of the signs or symptoms worsen. The patient is to followup with primary care physician in 2-3 days for repeat evaluation. The patient has no other questions or concerns at this time the patient will be discharged home.      Clinical Impression:   Dai was seen today for nasal congestion, pharyngitis and ear fullness.    Diagnoses and all orders for this visit:    Acute non-recurrent sinusitis, unspecified location    Acute upper respiratory infection, unspecified    Postnasal drip    Other orders  -     doxycycline hyclate (VIBRA-TABS) 100 MG tablet; Take 1 tablet by mouth 2 times daily for 10 days  -     predniSONE (DELTASONE) 10 MG tablet; 3 tabs once daily for 3 days, 2 tabs once daily for 3 days, 1 tab once daily for 3 days        The patient is to call for any concerns or return if any of the signs or symptoms worsen. The patient is to follow-up with PCP in the next 2-3 days for repeat evaluation repeat assessment or go directly to the emergency department.

## 2024-04-23 DIAGNOSIS — F51.01 PRIMARY INSOMNIA: Primary | ICD-10-CM

## 2024-04-23 RX ORDER — TEMAZEPAM 15 MG/1
CAPSULE ORAL
Qty: 90 CAPSULE | Refills: 1 | Status: SHIPPED | OUTPATIENT
Start: 2024-04-23 | End: 2024-07-23

## 2024-04-30 ENCOUNTER — OFFICE VISIT (OUTPATIENT)
Dept: FAMILY MEDICINE CLINIC | Age: 74
End: 2024-04-30

## 2024-04-30 VITALS
BODY MASS INDEX: 26.73 KG/M2 | WEIGHT: 132.4 LBS | SYSTOLIC BLOOD PRESSURE: 122 MMHG | DIASTOLIC BLOOD PRESSURE: 64 MMHG | HEART RATE: 88 BPM | OXYGEN SATURATION: 97 % | TEMPERATURE: 97.3 F

## 2024-04-30 DIAGNOSIS — J01.40 ACUTE NON-RECURRENT PANSINUSITIS: Primary | ICD-10-CM

## 2024-04-30 RX ORDER — AMOXICILLIN AND CLAVULANATE POTASSIUM 875; 125 MG/1; MG/1
1 TABLET, FILM COATED ORAL 2 TIMES DAILY
Qty: 14 TABLET | Refills: 0 | Status: SHIPPED | OUTPATIENT
Start: 2024-04-30 | End: 2024-05-07

## 2024-04-30 RX ORDER — PREDNISONE 20 MG/1
TABLET ORAL
Qty: 18 TABLET | Refills: 0 | Status: SHIPPED | OUTPATIENT
Start: 2024-04-30

## 2024-04-30 NOTE — PROGRESS NOTES
2024     Dai Whitehead 73 y.o. female    : 1950   Chief Complaint:   Congestion (Started 1 week ago, declines testing), Otalgia (Right ear), Headache, and Cough      History of Present Illness   Source of history provided by:  patient.    Dai Whitehead is a 73 y.o. old female who presents to walk-in for evaluation of congestion x 7 days. Associated symptoms include right ear pain, headache, and cough.  Since onset symptoms have been consistent.  Patient has had no known Covid 19 exposure.  Patient has not been diagnosed with COVID-19 in the last 90 days.  Has taken Flonase at home with some symptomatic relief. Denies any fever, chills, CP, dyspnea, LE edema, abdominal pain, nausea, vomiting, rash, dizziness, or lethargy. Denies any history of asthma, pneumonia, recurrent bronchitis or COPD.  They have no history of tobacco abuse.        ROS   Past Medical History:   Past Medical History:   Diagnosis Date    Acquired hypothyroidism 2017    Cataract, left eye     Essential hypertension 2017    Hyperlipidemia     Hypertension     Lumbar spinal stenosis     Radiculitis of left cervical region 2017    Thyroid disease      Past Surgical History:  has a past surgical history that includes  section; pelvic laparoscopy; Colonoscopy; Intracapsular cataract extraction (Left, 2019); and Cataract removal with implant (Right, 2016).  Social History:  reports that she quit smoking about 13 years ago. Her smoking use included cigarettes. She started smoking about 53 years ago. She has a 20.0 pack-year smoking history. She has never used smokeless tobacco. She reports current alcohol use. She reports that she does not use drugs.  Family History: family history includes COPD in her father; Cancer in her maternal aunt; Heart Attack in her maternal grandfather and maternal grandmother; Heart Disease in her mother; Hypothyroidism in her father; No Known Problems in her

## 2024-05-17 ENCOUNTER — OFFICE VISIT (OUTPATIENT)
Dept: FAMILY MEDICINE CLINIC | Age: 74
End: 2024-05-17

## 2024-05-17 VITALS
DIASTOLIC BLOOD PRESSURE: 72 MMHG | WEIGHT: 140.4 LBS | SYSTOLIC BLOOD PRESSURE: 132 MMHG | OXYGEN SATURATION: 98 % | HEART RATE: 70 BPM | BODY MASS INDEX: 28.34 KG/M2 | TEMPERATURE: 97.5 F

## 2024-05-17 DIAGNOSIS — J01.90 ACUTE NON-RECURRENT SINUSITIS, UNSPECIFIED LOCATION: Primary | ICD-10-CM

## 2024-05-17 DIAGNOSIS — J02.9 SORE THROAT: ICD-10-CM

## 2024-05-17 DIAGNOSIS — J06.9 ACUTE UPPER RESPIRATORY INFECTION, UNSPECIFIED: ICD-10-CM

## 2024-05-17 DIAGNOSIS — R09.81 NASAL CONGESTION: ICD-10-CM

## 2024-05-17 LAB
INFLUENZA A ANTIBODY: NEGATIVE
INFLUENZA B ANTIBODY: NEGATIVE
Lab: NORMAL
PERFORMING INSTRUMENT: NORMAL
QC PASS/FAIL: NORMAL
SARS-COV-2, POC: NORMAL

## 2024-05-17 RX ORDER — AZELASTINE 1 MG/ML
2 SPRAY, METERED NASAL 2 TIMES DAILY
Qty: 120 ML | Refills: 1 | Status: SHIPPED | OUTPATIENT
Start: 2024-05-17

## 2024-05-17 RX ORDER — CETIRIZINE HYDROCHLORIDE 10 MG/1
10 TABLET ORAL DAILY
Qty: 30 TABLET | Refills: 0 | Status: SHIPPED | OUTPATIENT
Start: 2024-05-17

## 2024-05-17 RX ORDER — DOXYCYCLINE HYCLATE 100 MG
100 TABLET ORAL 2 TIMES DAILY
Qty: 20 TABLET | Refills: 0 | Status: SHIPPED | OUTPATIENT
Start: 2024-05-17 | End: 2024-05-27

## 2024-05-17 RX ORDER — PREDNISONE 10 MG/1
TABLET ORAL
Qty: 18 TABLET | Refills: 0 | Status: SHIPPED | OUTPATIENT
Start: 2024-05-17

## 2024-05-17 NOTE — PROGRESS NOTES
gastrointestinal, neurological, , musculoskeletal, and integument systems and related systems to the presenting problem are either stated in the history of present illness or were not pertinent or were negative for the symptoms and/or complaints related to the presenting medical problem.  Positives and pertinent negatives as per HPI.  All others reviewed and are negative.      PMH:     Past Medical History:   Diagnosis Date    Acquired hypothyroidism 2017    Cataract, left eye     Essential hypertension 2017    Hyperlipidemia     Hypertension     Lumbar spinal stenosis     Radiculitis of left cervical region 2017    Thyroid disease        Past Surgical History:   Procedure Laterality Date    CATARACT REMOVAL WITH IMPLANT Right 2016     SECTION      x 2    COLONOSCOPY      INTRACAPSULAR CATARACT EXTRACTION Left 2019    LEFT EYE CATARACT EXTRACTION WITH  IOL IMPLANT performed by Maryanne Corrigan MD at [facility] OR    PELVIC LAPAROSCOPY         Family History   Problem Relation Age of Onset    Heart Disease Mother     Other Father         sepsis from TKA    COPD Father     Hypothyroidism Father     Heart Attack Maternal Grandmother     Heart Attack Maternal Grandfather     Cancer Maternal Aunt         Hodgkin lymphoma    No Known Problems Brother        Medications:     Current Outpatient Medications:     doxycycline hyclate (VIBRA-TABS) 100 MG tablet, Take 1 tablet by mouth 2 times daily for 10 days, Disp: 20 tablet, Rfl: 0    predniSONE (DELTASONE) 10 MG tablet, 3 tabs once daily for 3 days, 2 tabs once daily for 3 days, 1 tab once daily for 3 days, Disp: 18 tablet, Rfl: 0    azelastine (ASTELIN) 0.1 % nasal spray, 2 sprays by Nasal route 2 times daily Use in each nostril as directed, Disp: 120 mL, Rfl: 1    cetirizine (ZYRTEC) 10 MG tablet, Take 1 tablet by mouth daily, Disp: 30 tablet, Rfl: 0    temazepam (RESTORIL) 15 MG capsule, TAKE ONE CAPSULE BY MOUTH nightly AS NEEDED

## 2024-05-30 ENCOUNTER — TELEPHONE (OUTPATIENT)
Dept: PRIMARY CARE CLINIC | Age: 74
End: 2024-05-30

## 2024-05-30 RX ORDER — METHYLPREDNISOLONE 4 MG/1
TABLET ORAL
Qty: 1 KIT | Refills: 0 | Status: SHIPPED | OUTPATIENT
Start: 2024-05-30

## 2024-05-30 RX ORDER — CEFDINIR 300 MG/1
300 CAPSULE ORAL 2 TIMES DAILY
Qty: 20 CAPSULE | Refills: 0 | Status: SHIPPED | OUTPATIENT
Start: 2024-05-30 | End: 2024-06-09

## 2024-05-30 NOTE — TELEPHONE ENCOUNTER
Patient's  was just here. States you told her to call and you would send over a medication for her since she was just here on 5/17. States her ears are still plugged.     TANVIR Day

## 2024-06-11 ENCOUNTER — OFFICE VISIT (OUTPATIENT)
Dept: FAMILY MEDICINE CLINIC | Age: 74
End: 2024-06-11

## 2024-06-11 VITALS
DIASTOLIC BLOOD PRESSURE: 68 MMHG | OXYGEN SATURATION: 97 % | BODY MASS INDEX: 28.26 KG/M2 | TEMPERATURE: 98.4 F | SYSTOLIC BLOOD PRESSURE: 138 MMHG | WEIGHT: 140 LBS | HEART RATE: 102 BPM

## 2024-06-11 DIAGNOSIS — J06.9 URI WITH COUGH AND CONGESTION: Primary | ICD-10-CM

## 2024-06-11 RX ORDER — PREDNISONE 20 MG/1
TABLET ORAL
Qty: 18 TABLET | Refills: 0 | Status: SHIPPED | OUTPATIENT
Start: 2024-06-11

## 2024-06-11 RX ORDER — AZITHROMYCIN 250 MG/1
TABLET, FILM COATED ORAL
Qty: 6 TABLET | Refills: 0 | Status: SHIPPED | OUTPATIENT
Start: 2024-06-11 | End: 2024-06-21

## 2024-06-11 RX ORDER — DEXTROMETHORPHAN HYDROBROMIDE AND PROMETHAZINE HYDROCHLORIDE 15; 6.25 MG/5ML; MG/5ML
5 SYRUP ORAL 4 TIMES DAILY PRN
Qty: 118 ML | Refills: 0 | Status: SHIPPED | OUTPATIENT
Start: 2024-06-11 | End: 2024-06-18

## 2024-06-11 NOTE — PROGRESS NOTES
2024     Dai Whitehead 73 y.o. female    : 1950   Chief Complaint:   Cough (X2 days, declines testing), Congestion, and Otalgia      History of Present Illness   Source of history provided by:  patient.    Dai Whitehead is a 73 y.o. old female who presents to walk-in for evaluation of cough x few days. Associated symptoms include ear pain and congestion.  Since onset symptoms have been consistent.  Patient has had no known Covid 19 exposure.  Patient has not been diagnosed with COVID-19 in the last 90 days.  Has taken nasal spray and zyrtec at home with no symptomatic relief. Denies any fever, chills, CP, dyspnea, LE edema, abdominal pain, nausea, vomiting, rash, dizziness, or lethargy. Denies any history of asthma, pneumonia, recurrent bronchitis or COPD.  They have no history of tobacco abuse.        ROS   Past Medical History:   Past Medical History:   Diagnosis Date    Acquired hypothyroidism 2017    Cataract, left eye     Essential hypertension 2017    Hyperlipidemia     Hypertension     Lumbar spinal stenosis     Radiculitis of left cervical region 2017    Thyroid disease      Past Surgical History:  has a past surgical history that includes  section; pelvic laparoscopy; Colonoscopy; Intracapsular cataract extraction (Left, 2019); and Cataract removal with implant (Right, 2016).  Social History:  reports that she quit smoking about 13 years ago. Her smoking use included cigarettes. She started smoking about 53 years ago. She has a 20.0 pack-year smoking history. She has never used smokeless tobacco. She reports current alcohol use. She reports that she does not use drugs.  Family History: family history includes COPD in her father; Cancer in her maternal aunt; Heart Attack in her maternal grandfather and maternal grandmother; Heart Disease in her mother; Hypothyroidism in her father; No Known Problems in her brother; Other in her father.

## 2024-06-17 DIAGNOSIS — E03.9 ACQUIRED HYPOTHYROIDISM: ICD-10-CM

## 2024-06-17 DIAGNOSIS — N18.31 STAGE 3A CHRONIC KIDNEY DISEASE (HCC): ICD-10-CM

## 2024-06-17 DIAGNOSIS — E78.2 MIXED HYPERLIPIDEMIA: ICD-10-CM

## 2024-06-17 DIAGNOSIS — I10 ESSENTIAL HYPERTENSION: ICD-10-CM

## 2024-06-17 DIAGNOSIS — R73.01 IMPAIRED FASTING GLUCOSE: ICD-10-CM

## 2024-06-17 LAB
ALBUMIN: 3.7 G/DL (ref 3.5–5.2)
ALP BLD-CCNC: 59 U/L (ref 35–104)
ALT SERPL-CCNC: 15 U/L (ref 0–32)
ANION GAP SERPL CALCULATED.3IONS-SCNC: 12 MMOL/L (ref 7–16)
AST SERPL-CCNC: 18 U/L (ref 0–31)
BASOPHILS ABSOLUTE: 0.05 K/UL (ref 0–0.2)
BASOPHILS RELATIVE PERCENT: 0 % (ref 0–2)
BILIRUB SERPL-MCNC: 0.2 MG/DL (ref 0–1.2)
BILIRUBIN, URINE: NEGATIVE
BUN BLDV-MCNC: 26 MG/DL (ref 6–23)
CALCIUM IONIZED: 1.26 MMOL/L (ref 1.15–1.33)
CALCIUM SERPL-MCNC: 9.2 MG/DL (ref 8.6–10.2)
CHLORIDE BLD-SCNC: 105 MMOL/L (ref 98–107)
CHOLESTEROL, TOTAL: 223 MG/DL
CO2: 27 MMOL/L (ref 22–29)
COLOR: YELLOW
CREAT SERPL-MCNC: 1 MG/DL (ref 0.5–1)
EOSINOPHILS ABSOLUTE: 0.06 K/UL (ref 0.05–0.5)
EOSINOPHILS RELATIVE PERCENT: 0 % (ref 0–6)
GFR, ESTIMATED: 56 ML/MIN/1.73M2
GLUCOSE BLD-MCNC: 83 MG/DL (ref 74–99)
GLUCOSE URINE: NEGATIVE MG/DL
HBA1C MFR BLD: 6.4 % (ref 4–5.6)
HCT VFR BLD CALC: 40.4 % (ref 34–48)
HDLC SERPL-MCNC: 81 MG/DL
HEMOGLOBIN: 12.7 G/DL (ref 11.5–15.5)
IMMATURE GRANULOCYTES %: 1 % (ref 0–5)
IMMATURE GRANULOCYTES ABSOLUTE: 0.18 K/UL (ref 0–0.58)
KETONES, URINE: NEGATIVE MG/DL
LDL CHOLESTEROL: 102 MG/DL
LEUKOCYTE ESTERASE, URINE: ABNORMAL
LYMPHOCYTES ABSOLUTE: 7.9 K/UL (ref 1.5–4)
LYMPHOCYTES RELATIVE PERCENT: 50 % (ref 20–42)
MAGNESIUM: 2 MG/DL (ref 1.6–2.6)
MCH RBC QN AUTO: 31.1 PG (ref 26–35)
MCHC RBC AUTO-ENTMCNC: 31.4 G/DL (ref 32–34.5)
MCV RBC AUTO: 99 FL (ref 80–99.9)
MONOCYTES ABSOLUTE: 0.82 K/UL (ref 0.1–0.95)
MONOCYTES RELATIVE PERCENT: 5 % (ref 2–12)
NEUTROPHILS ABSOLUTE: 6.75 K/UL (ref 1.8–7.3)
NEUTROPHILS RELATIVE PERCENT: 43 % (ref 43–80)
NITRITE, URINE: NEGATIVE
PDW BLD-RTO: 13.2 % (ref 11.5–15)
PH, URINE: 6 (ref 5–9)
PHOSPHORUS: 2.7 MG/DL (ref 2.5–4.5)
PLATELET # BLD: 278 K/UL (ref 130–450)
PMV BLD AUTO: 11.3 FL (ref 7–12)
POTASSIUM SERPL-SCNC: 3.8 MMOL/L (ref 3.5–5)
PROTEIN UA: NEGATIVE MG/DL
PTH INTACT: 40.9 PG/ML (ref 15–65)
RBC # BLD: 4.08 M/UL (ref 3.5–5.5)
RBC # BLD: NORMAL 10*6/UL
RBC UA: ABNORMAL /HPF
SODIUM BLD-SCNC: 144 MMOL/L (ref 132–146)
SPECIFIC GRAVITY UA: 1.02 (ref 1–1.03)
T4 FREE: 2 NG/DL (ref 0.9–1.7)
TOTAL PROTEIN: 6.3 G/DL (ref 6.4–8.3)
TRIGL SERPL-MCNC: 201 MG/DL
TSH SERPL DL<=0.05 MIU/L-ACNC: 0.09 UIU/ML (ref 0.27–4.2)
TURBIDITY: CLEAR
URIC ACID: 6.2 MG/DL (ref 2.4–5.7)
URINE HGB: NEGATIVE
UROBILINOGEN, URINE: 0.2 EU/DL (ref 0–1)
VITAMIN D 25-HYDROXY: 57.9 NG/ML (ref 30–100)
VLDLC SERPL CALC-MCNC: 40 MG/DL
WBC # BLD: 15.8 K/UL (ref 4.5–11.5)
WBC UA: ABNORMAL /HPF

## 2024-06-18 ENCOUNTER — OFFICE VISIT (OUTPATIENT)
Dept: PRIMARY CARE CLINIC | Age: 74
End: 2024-06-18
Payer: MEDICARE

## 2024-06-18 VITALS
SYSTOLIC BLOOD PRESSURE: 126 MMHG | OXYGEN SATURATION: 98 % | DIASTOLIC BLOOD PRESSURE: 60 MMHG | HEART RATE: 69 BPM | TEMPERATURE: 98.2 F | HEIGHT: 59 IN | BODY MASS INDEX: 28.22 KG/M2 | WEIGHT: 140 LBS

## 2024-06-18 DIAGNOSIS — E78.2 MIXED HYPERLIPIDEMIA: ICD-10-CM

## 2024-06-18 DIAGNOSIS — Z00.00 MEDICARE ANNUAL WELLNESS VISIT, SUBSEQUENT: Primary | ICD-10-CM

## 2024-06-18 DIAGNOSIS — E03.9 ACQUIRED HYPOTHYROIDISM: ICD-10-CM

## 2024-06-18 DIAGNOSIS — N18.31 STAGE 3A CHRONIC KIDNEY DISEASE (HCC): ICD-10-CM

## 2024-06-18 DIAGNOSIS — J32.9 RECURRENT SINUSITIS: ICD-10-CM

## 2024-06-18 DIAGNOSIS — Z12.31 ENCOUNTER FOR SCREENING MAMMOGRAM FOR MALIGNANT NEOPLASM OF BREAST: ICD-10-CM

## 2024-06-18 DIAGNOSIS — I10 ESSENTIAL HYPERTENSION: ICD-10-CM

## 2024-06-18 PROCEDURE — 3074F SYST BP LT 130 MM HG: CPT | Performed by: FAMILY MEDICINE

## 2024-06-18 PROCEDURE — 3078F DIAST BP <80 MM HG: CPT | Performed by: FAMILY MEDICINE

## 2024-06-18 PROCEDURE — G0439 PPPS, SUBSEQ VISIT: HCPCS | Performed by: FAMILY MEDICINE

## 2024-06-18 PROCEDURE — 1123F ACP DISCUSS/DSCN MKR DOCD: CPT | Performed by: FAMILY MEDICINE

## 2024-06-18 RX ORDER — ROSUVASTATIN CALCIUM 5 MG/1
5 TABLET, COATED ORAL DAILY
Qty: 90 TABLET | Refills: 1 | Status: SHIPPED | OUTPATIENT
Start: 2024-06-18

## 2024-06-18 RX ORDER — DILTIAZEM HYDROCHLORIDE 240 MG/1
240 CAPSULE, EXTENDED RELEASE ORAL DAILY
Qty: 90 CAPSULE | Refills: 1 | Status: SHIPPED | OUTPATIENT
Start: 2024-06-18

## 2024-06-18 RX ORDER — LISINOPRIL AND HYDROCHLOROTHIAZIDE 25; 20 MG/1; MG/1
1 TABLET ORAL DAILY
Qty: 90 TABLET | Refills: 1 | Status: SHIPPED | OUTPATIENT
Start: 2024-06-18

## 2024-06-18 RX ORDER — LEVOTHYROXINE SODIUM 0.1 MG/1
100 TABLET ORAL DAILY
Qty: 90 TABLET | Refills: 1 | Status: SHIPPED | OUTPATIENT
Start: 2024-06-18

## 2024-06-18 ASSESSMENT — PATIENT HEALTH QUESTIONNAIRE - PHQ9
SUM OF ALL RESPONSES TO PHQ QUESTIONS 1-9: 1
SUM OF ALL RESPONSES TO PHQ9 QUESTIONS 1 & 2: 1
2. FEELING DOWN, DEPRESSED OR HOPELESS: NOT AT ALL
SUM OF ALL RESPONSES TO PHQ QUESTIONS 1-9: 1
1. LITTLE INTEREST OR PLEASURE IN DOING THINGS: SEVERAL DAYS

## 2024-06-18 ASSESSMENT — LIFESTYLE VARIABLES
HOW MANY STANDARD DRINKS CONTAINING ALCOHOL DO YOU HAVE ON A TYPICAL DAY: 1 OR 2
HOW OFTEN DO YOU HAVE A DRINK CONTAINING ALCOHOL: 2-4 TIMES A MONTH

## 2024-06-18 NOTE — PROGRESS NOTES
Medicare Annual Wellness Visit    Dai Whitehead is here for Medicare AWV    Assessment & Plan   Medicare annual wellness visit, subsequent  HRA reviewed and addressed.  Due for Mammo/DEXA- declines DEXA.  Declines LDCT.  Labs reviewed.  Repeat full labs in 6 months.     Essential hypertension  -     lisinopril-hydroCHLOROthiazide (PRINZIDE;ZESTORETIC) 20-25 MG per tablet; Take 1 tablet by mouth daily, Disp-90 tablet, R-1Normal  -     dilTIAZem (DILT-XR) 240 MG extended release capsule; Take 1 capsule by mouth daily, Disp-90 capsule, R-1Normal    Acquired hypothyroidism  -     levothyroxine (SYNTHROID) 100 MCG tablet; Take 1 tablet by mouth daily, Disp-90 tablet, R-1Normal  -     T4, Free; Future  -     TSH; Future  Hyperactive at this time.  Will reduce to 100 mcg 6 days/week and recheck in 6 weeks    Mixed hyperlipidemia  -     rosuvastatin (CRESTOR) 5 MG tablet; Take 1 tablet by mouth daily, Disp-90 tablet, R-1Normal    Stage 3a chronic kidney disease (HCC)  Stable.  Increase water.     Recurrent sinusitis  Discussed daily antihistamine, Flonase, Astelin and saline.  Steroids have caused A1c to go up.  Recommend no further steroids.  If persistent, will sent to ENT.     Encounter for screening mammogram for malignant neoplasm of breast  -     NEERU DIGITAL SCREEN W OR WO CAD BILATERAL; Future    Recommendations for Preventive Services Due: see orders and patient instructions/AVS.  Recommended screening schedule for the next 5-10 years is provided to the patient in written form: see Patient Instructions/AVS.     Return in about 6 months (around 12/18/2024), or if symptoms worsen or fail to improve, for Chronic medical conditions.     Subjective   The following acute and/or chronic problems were also addressed today:    Patient has been in [facility] 4 times in the last 2 months for sinus/ear issues.  Multiple rounds of antibiotics and steroids.     Patient's complete Health Risk Assessment and screening values

## 2024-07-17 DIAGNOSIS — Z12.31 ENCOUNTER FOR SCREENING MAMMOGRAM FOR MALIGNANT NEOPLASM OF BREAST: ICD-10-CM

## 2024-09-04 DIAGNOSIS — I10 ESSENTIAL HYPERTENSION: Primary | ICD-10-CM

## 2024-09-04 DIAGNOSIS — E55.9 VITAMIN D INSUFFICIENCY: ICD-10-CM

## 2024-09-04 DIAGNOSIS — E03.9 ACQUIRED HYPOTHYROIDISM: ICD-10-CM

## 2024-09-04 DIAGNOSIS — R73.01 IMPAIRED FASTING GLUCOSE: ICD-10-CM

## 2024-09-04 LAB
T4 FREE: 1.7 NG/DL (ref 0.9–1.7)
TSH SERPL DL<=0.05 MIU/L-ACNC: 0.16 UIU/ML (ref 0.27–4.2)

## 2024-09-19 ENCOUNTER — OFFICE VISIT (OUTPATIENT)
Dept: FAMILY MEDICINE CLINIC | Age: 74
End: 2024-09-19

## 2024-09-19 VITALS
HEART RATE: 84 BPM | DIASTOLIC BLOOD PRESSURE: 90 MMHG | BODY MASS INDEX: 28.48 KG/M2 | WEIGHT: 141 LBS | SYSTOLIC BLOOD PRESSURE: 152 MMHG | OXYGEN SATURATION: 98 % | TEMPERATURE: 98.1 F

## 2024-09-19 DIAGNOSIS — R05.9 COUGH, UNSPECIFIED TYPE: ICD-10-CM

## 2024-09-19 DIAGNOSIS — J06.9 URI WITH COUGH AND CONGESTION: Primary | ICD-10-CM

## 2024-09-19 RX ORDER — AZITHROMYCIN 250 MG/1
TABLET, FILM COATED ORAL
Qty: 6 TABLET | Refills: 0 | Status: SHIPPED | OUTPATIENT
Start: 2024-09-19

## 2024-09-19 RX ORDER — GUAIFENESIN, PSEUDOEPHEDRINE HYDROCHLORIDE 600; 60 MG/1; MG/1
1 TABLET, EXTENDED RELEASE ORAL EVERY 12 HOURS PRN
Qty: 14 TABLET | Refills: 0 | Status: SHIPPED | OUTPATIENT
Start: 2024-09-19

## 2024-09-20 DIAGNOSIS — J06.9 URI WITH COUGH AND CONGESTION: ICD-10-CM

## 2024-09-23 DIAGNOSIS — J06.9 URI WITH COUGH AND CONGESTION: ICD-10-CM

## 2024-09-23 RX ORDER — DEXTROMETHORPHAN HYDROBROMIDE AND PROMETHAZINE HYDROCHLORIDE 15; 6.25 MG/5ML; MG/5ML
5 SYRUP ORAL 4 TIMES DAILY PRN
Qty: 118 ML | Refills: 0 | Status: SHIPPED | OUTPATIENT
Start: 2024-09-23 | End: 2024-09-30

## 2024-09-23 RX ORDER — DEXTROMETHORPHAN HYDROBROMIDE AND PROMETHAZINE HYDROCHLORIDE 15; 6.25 MG/5ML; MG/5ML
SYRUP ORAL
Qty: 118 ML | Refills: 0 | OUTPATIENT
Start: 2024-09-23

## 2024-10-21 RX ORDER — EZETIMIBE 10 MG/1
TABLET ORAL
Qty: 36 TABLET | Refills: 0 | Status: SHIPPED | OUTPATIENT
Start: 2024-10-21

## 2024-12-13 DIAGNOSIS — E03.9 ACQUIRED HYPOTHYROIDISM: ICD-10-CM

## 2024-12-13 DIAGNOSIS — R73.01 IMPAIRED FASTING GLUCOSE: ICD-10-CM

## 2024-12-13 DIAGNOSIS — I10 ESSENTIAL HYPERTENSION: ICD-10-CM

## 2024-12-13 DIAGNOSIS — E55.9 VITAMIN D INSUFFICIENCY: ICD-10-CM

## 2024-12-13 LAB
ALBUMIN: 4.2 G/DL (ref 3.5–5.2)
ALP BLD-CCNC: 75 U/L (ref 35–104)
ALT SERPL-CCNC: 13 U/L (ref 0–32)
ANION GAP SERPL CALCULATED.3IONS-SCNC: 15 MMOL/L (ref 7–16)
AST SERPL-CCNC: 24 U/L (ref 0–31)
BASOPHILS ABSOLUTE: 0.02 K/UL (ref 0–0.2)
BASOPHILS RELATIVE PERCENT: 0 % (ref 0–2)
BILIRUB SERPL-MCNC: 0.2 MG/DL (ref 0–1.2)
BUN BLDV-MCNC: 24 MG/DL (ref 6–23)
CALCIUM SERPL-MCNC: 9.6 MG/DL (ref 8.6–10.2)
CHLORIDE BLD-SCNC: 106 MMOL/L (ref 98–107)
CHOLESTEROL, TOTAL: 231 MG/DL
CO2: 25 MMOL/L (ref 22–29)
CREAT SERPL-MCNC: 1.1 MG/DL (ref 0.5–1)
EOSINOPHILS ABSOLUTE: 0.09 K/UL (ref 0.05–0.5)
EOSINOPHILS RELATIVE PERCENT: 1 % (ref 0–6)
GFR, ESTIMATED: 51 ML/MIN/1.73M2
GLUCOSE BLD-MCNC: 101 MG/DL (ref 74–99)
HBA1C MFR BLD: 5.5 % (ref 4–5.6)
HCT VFR BLD CALC: 41.4 % (ref 34–48)
HDLC SERPL-MCNC: 59 MG/DL
HEMOGLOBIN: 13 G/DL (ref 11.5–15.5)
IMMATURE GRANULOCYTES %: 0 % (ref 0–5)
IMMATURE GRANULOCYTES ABSOLUTE: <0.03 K/UL (ref 0–0.58)
LDL CHOLESTEROL: 119 MG/DL
LYMPHOCYTES ABSOLUTE: 4.62 K/UL (ref 1.5–4)
LYMPHOCYTES RELATIVE PERCENT: 54 % (ref 20–42)
MCH RBC QN AUTO: 30.5 PG (ref 26–35)
MCHC RBC AUTO-ENTMCNC: 31.4 G/DL (ref 32–34.5)
MCV RBC AUTO: 97.2 FL (ref 80–99.9)
MONOCYTES ABSOLUTE: 0.58 K/UL (ref 0.1–0.95)
MONOCYTES RELATIVE PERCENT: 7 % (ref 2–12)
NEUTROPHILS ABSOLUTE: 3.23 K/UL (ref 1.8–7.3)
NEUTROPHILS RELATIVE PERCENT: 38 % (ref 43–80)
PDW BLD-RTO: 13.4 % (ref 11.5–15)
PLATELET # BLD: 237 K/UL (ref 130–450)
PMV BLD AUTO: 11.6 FL (ref 7–12)
POTASSIUM SERPL-SCNC: 4.4 MMOL/L (ref 3.5–5)
RBC # BLD: 4.26 M/UL (ref 3.5–5.5)
SODIUM BLD-SCNC: 146 MMOL/L (ref 132–146)
T4 FREE: 1.3 NG/DL (ref 0.9–1.7)
TOTAL PROTEIN: 6.7 G/DL (ref 6.4–8.3)
TRIGL SERPL-MCNC: 265 MG/DL
TSH SERPL DL<=0.05 MIU/L-ACNC: 5.17 UIU/ML (ref 0.27–4.2)
VITAMIN D 25-HYDROXY: 68.6 NG/ML (ref 30–100)
VLDLC SERPL CALC-MCNC: 53 MG/DL
WBC # BLD: 8.6 K/UL (ref 4.5–11.5)

## 2024-12-14 SDOH — ECONOMIC STABILITY: FOOD INSECURITY: WITHIN THE PAST 12 MONTHS, THE FOOD YOU BOUGHT JUST DIDN'T LAST AND YOU DIDN'T HAVE MONEY TO GET MORE.: NEVER TRUE

## 2024-12-14 SDOH — ECONOMIC STABILITY: TRANSPORTATION INSECURITY
IN THE PAST 12 MONTHS, HAS LACK OF TRANSPORTATION KEPT YOU FROM MEETINGS, WORK, OR FROM GETTING THINGS NEEDED FOR DAILY LIVING?: NO

## 2024-12-14 SDOH — ECONOMIC STABILITY: FOOD INSECURITY: WITHIN THE PAST 12 MONTHS, YOU WORRIED THAT YOUR FOOD WOULD RUN OUT BEFORE YOU GOT MONEY TO BUY MORE.: NEVER TRUE

## 2024-12-14 SDOH — ECONOMIC STABILITY: INCOME INSECURITY: HOW HARD IS IT FOR YOU TO PAY FOR THE VERY BASICS LIKE FOOD, HOUSING, MEDICAL CARE, AND HEATING?: NOT HARD AT ALL

## 2024-12-17 ENCOUNTER — OFFICE VISIT (OUTPATIENT)
Dept: PRIMARY CARE CLINIC | Age: 74
End: 2024-12-17

## 2024-12-17 VITALS
OXYGEN SATURATION: 98 % | SYSTOLIC BLOOD PRESSURE: 140 MMHG | BODY MASS INDEX: 28.63 KG/M2 | HEART RATE: 78 BPM | DIASTOLIC BLOOD PRESSURE: 80 MMHG | WEIGHT: 142 LBS | HEIGHT: 59 IN

## 2024-12-17 DIAGNOSIS — F51.01 PRIMARY INSOMNIA: ICD-10-CM

## 2024-12-17 DIAGNOSIS — E78.2 MIXED HYPERLIPIDEMIA: ICD-10-CM

## 2024-12-17 DIAGNOSIS — I10 ESSENTIAL HYPERTENSION: Primary | ICD-10-CM

## 2024-12-17 DIAGNOSIS — N18.31 STAGE 3A CHRONIC KIDNEY DISEASE (HCC): ICD-10-CM

## 2024-12-17 DIAGNOSIS — H81.90 EPISODIC RECURRENT VERTIGO: ICD-10-CM

## 2024-12-17 DIAGNOSIS — K58.0 IRRITABLE BOWEL SYNDROME WITH DIARRHEA: ICD-10-CM

## 2024-12-17 DIAGNOSIS — R73.01 IMPAIRED FASTING GLUCOSE: ICD-10-CM

## 2024-12-17 DIAGNOSIS — E03.9 ACQUIRED HYPOTHYROIDISM: ICD-10-CM

## 2024-12-17 RX ORDER — DILTIAZEM HYDROCHLORIDE 240 MG/1
240 CAPSULE, EXTENDED RELEASE ORAL DAILY
Qty: 90 CAPSULE | Refills: 1 | Status: SHIPPED | OUTPATIENT
Start: 2024-12-17

## 2024-12-17 RX ORDER — MECLIZINE HYDROCHLORIDE 25 MG/1
25 TABLET ORAL 3 TIMES DAILY PRN
Qty: 270 TABLET | Refills: 1 | Status: SHIPPED | OUTPATIENT
Start: 2024-12-17

## 2024-12-17 RX ORDER — LEVOTHYROXINE SODIUM 100 UG/1
TABLET ORAL
Qty: 90 TABLET | Refills: 1 | Status: SHIPPED | OUTPATIENT
Start: 2024-12-17

## 2024-12-17 RX ORDER — FAMOTIDINE 20 MG/1
20 TABLET, FILM COATED ORAL 2 TIMES DAILY
Qty: 180 TABLET | Refills: 1 | Status: SHIPPED | OUTPATIENT
Start: 2024-12-17

## 2024-12-17 RX ORDER — EZETIMIBE 10 MG/1
10 TABLET ORAL DAILY
Qty: 90 TABLET | Refills: 1 | Status: SHIPPED | OUTPATIENT
Start: 2024-12-17

## 2024-12-17 RX ORDER — PROMETHAZINE HYDROCHLORIDE 25 MG/1
25 TABLET ORAL EVERY 6 HOURS PRN
Qty: 30 TABLET | Refills: 5 | Status: SHIPPED | OUTPATIENT
Start: 2024-12-17

## 2024-12-17 RX ORDER — TEMAZEPAM 15 MG/1
15 CAPSULE ORAL NIGHTLY PRN
Qty: 90 CAPSULE | Refills: 1 | Status: SHIPPED | OUTPATIENT
Start: 2024-12-17 | End: 2025-06-15

## 2024-12-17 RX ORDER — LISINOPRIL AND HYDROCHLOROTHIAZIDE 20; 25 MG/1; MG/1
1 TABLET ORAL DAILY
Qty: 90 TABLET | Refills: 1 | Status: SHIPPED | OUTPATIENT
Start: 2024-12-17

## 2024-12-17 ASSESSMENT — ENCOUNTER SYMPTOMS
SHORTNESS OF BREATH: 0
DIARRHEA: 0
ABDOMINAL PAIN: 0
BACK PAIN: 1
NAUSEA: 0
CONSTIPATION: 1
COUGH: 0
VOMITING: 0
WHEEZING: 0

## 2024-12-17 NOTE — ASSESSMENT & PLAN NOTE
Orders:    ezetimibe (ZETIA) 10 MG tablet; Take 1 tablet by mouth daily    Lipid Panel; Future

## 2024-12-17 NOTE — ASSESSMENT & PLAN NOTE
Chronic, at goal (stable), continue current treatment plan    Orders:    levothyroxine (SYNTHROID) 100 MCG tablet; Take one tablet by mouth 6 days/week    TSH; Future    T4, Free; Future

## 2024-12-17 NOTE — ASSESSMENT & PLAN NOTE
Discussed increased water intake.     Orders:    CBC with Auto Differential; Future    Comprehensive Metabolic Panel; Future    Lipid Panel; Future    Vitamin D 25 Hydroxy; Future    Urinalysis; Future    Uric Acid; Future    PTH, Intact; Future    Phosphorus; Future    Magnesium; Future    Calcium, Ionized; Future

## 2024-12-17 NOTE — PROGRESS NOTES
24  Dai Whitehead : 1950 Sex: female  Age: 74 y.o.    Chief Complaint   Patient presents with    Hypertension     HPI:  74 y.o. female presents today for 6 month follow up of chronic medical conditions, medication refills and FBW results.  Patient's chart, medical, surgical and medication history all reviewed.    Hypertension   The patient presents today for follow up of HTN.  The problem is borderline controlled. Risk factors for coronary artery disease include Age > 30, HTN and elevated cholesterol. Current treatments include diltiazem (Cardizem) and hydrochlorothiazide (HCTZ). The patient is compliant all of the time.  Lifestyle changes the patient has made include  none .  Today the patient is complaining of none.        Hypothyroidism  Patient presents for routine follow up of Hypothyroidism. Current symptoms: diarrhea and hot flashes . Patient denies change in energy level, heat / cold intolerance, nervousness, palpitations and weight changes. Symptoms have been well-controlled. No difficulty swallowing or masses felt.    Lab Results   Component Value Date    TSH 5.17 (H) 2024   Patient is currently taking levothyroxine 100 mcg daily with 2 tablets on sundays.    Hyperlipidemia  The 10-year ASCVD risk score (Beba DK, et al., 2019) is: 25%    Values used to calculate the score:      Age: 74 years      Sex: Female      Is Non- : No      Diabetic: No      Tobacco smoker: No      Systolic Blood Pressure: 140 mmHg      Is BP treated: Yes      HDL Cholesterol: 59 mg/dL      Total Cholesterol: 231 mg/dL  Had aching in her legs.  Has been ongoing for years, but was getting worse.  Lipitor stopped, but no improvement in symptoms.  Was put on Crestor and had severe pains again.     Underwent EGD and colonoscopy with GI.  Found to have diverticulosis.  Continues to have intermittent LLQ pains.  Not having regular BMs.     ROS:  Review of Systems   Constitutional:

## 2024-12-17 NOTE — ASSESSMENT & PLAN NOTE
Borderline control.  Will monitor.  Labs reviewed in detail.  Repeat in 6 months.     Orders:    lisinopril-hydroCHLOROthiazide (PRINZIDE;ZESTORETIC) 20-25 MG per tablet; Take 1 tablet by mouth daily    dilTIAZem (DILT-XR) 240 MG extended release capsule; Take 1 capsule by mouth daily    CBC with Auto Differential; Future    Comprehensive Metabolic Panel; Future    Lipid Panel; Future    TSH; Future    Vitamin D 25 Hydroxy; Future    Urinalysis; Future    T4, Free; Future

## 2025-02-10 ENCOUNTER — OFFICE VISIT (OUTPATIENT)
Dept: FAMILY MEDICINE CLINIC | Age: 75
End: 2025-02-10

## 2025-02-10 VITALS
HEART RATE: 89 BPM | BODY MASS INDEX: 28.71 KG/M2 | OXYGEN SATURATION: 98 % | TEMPERATURE: 97.5 F | RESPIRATION RATE: 17 BRPM | HEIGHT: 59 IN | WEIGHT: 142.4 LBS | SYSTOLIC BLOOD PRESSURE: 144 MMHG | DIASTOLIC BLOOD PRESSURE: 62 MMHG

## 2025-02-10 DIAGNOSIS — J06.9 URI WITH COUGH AND CONGESTION: Primary | ICD-10-CM

## 2025-02-10 RX ORDER — AZITHROMYCIN 250 MG/1
TABLET, FILM COATED ORAL
Qty: 6 TABLET | Refills: 0 | Status: SHIPPED | OUTPATIENT
Start: 2025-02-10

## 2025-02-10 NOTE — PROGRESS NOTES
Chief Complaint       Cough (Started 4 days ago), Congestion, Sore Throat, and Headache    History of Present Illness   Source of history provided by:  patient.    History of Present Illness  The patient is a 74-year-old female who presents for evaluation of cough, runny nose, and congestion.    She reports experiencing symptoms of cough, rhinorrhea, and nasal congestion. She also mentions feeling cold but has not had any fevers. She has no history of asthma or chronic pulmonary conditions. Her current treatment regimen includes Lamictal, Astelin nasal spray, Mucinex D, and a prescribed liquid cough suppressant. Additionally, she took Coricidin the previous night.    MEDICATIONS  Lamictal, Astelin nasal spray, Mucinex D, Coricidin.    All other ROS negative unless otherwise stated in HPI.      ROS    Unless otherwise stated in this report or unable to obtain because of the patient's clinical or mental status as evidenced by the medical record, this patients's positive and negative responses for Review of Systems, constitutional, psych, eyes, ENT, cardiovascular, respiratory, gastrointestinal, neurological, genitourinary, musculoskeletal, integument systems and systems related to the presenting problem are either stated in the preceding or were not pertinent or were negative for the symptoms and/or complaints related to the medical problem.      Physical Exam         VS:  BP (!) 144/62 (Site: Right Upper Arm, Position: Sitting)   Pulse 89   Temp 97.5 °F (36.4 °C) (Temporal)   Resp 17   Ht 1.499 m (4' 11\")   Wt 64.6 kg (142 lb 6.4 oz)   SpO2 98%   BMI 28.76 kg/m²    Oxygen Saturation Interpretation: Normal.    Constitutional:  Alert, development consistent with age.  Ears:  External Ears: Bilateral pinna normal. TMs translucent without erythema or perforation bilaterally.  Canals normal bilaterally without swelling or exudate  Nose: Mild congestion of the nasal mucosa. There is injection to middle

## 2025-04-16 ENCOUNTER — OFFICE VISIT (OUTPATIENT)
Dept: FAMILY MEDICINE CLINIC | Age: 75
End: 2025-04-16
Payer: MEDICARE

## 2025-04-16 VITALS
HEIGHT: 59 IN | WEIGHT: 142.8 LBS | OXYGEN SATURATION: 99 % | TEMPERATURE: 97.9 F | SYSTOLIC BLOOD PRESSURE: 110 MMHG | HEART RATE: 85 BPM | BODY MASS INDEX: 28.79 KG/M2 | DIASTOLIC BLOOD PRESSURE: 68 MMHG

## 2025-04-16 DIAGNOSIS — J40 SINOBRONCHITIS: ICD-10-CM

## 2025-04-16 DIAGNOSIS — R05.9 COUGH, UNSPECIFIED TYPE: Primary | ICD-10-CM

## 2025-04-16 DIAGNOSIS — J32.9 SINOBRONCHITIS: ICD-10-CM

## 2025-04-16 PROCEDURE — 87426 SARSCOV CORONAVIRUS AG IA: CPT | Performed by: FAMILY MEDICINE

## 2025-04-16 PROCEDURE — 87804 INFLUENZA ASSAY W/OPTIC: CPT | Performed by: FAMILY MEDICINE

## 2025-04-16 RX ORDER — CEFDINIR 300 MG/1
300 CAPSULE ORAL 2 TIMES DAILY
Qty: 20 CAPSULE | Refills: 0 | Status: SHIPPED | OUTPATIENT
Start: 2025-04-16 | End: 2025-04-26

## 2025-04-16 ASSESSMENT — ENCOUNTER SYMPTOMS
EYE PAIN: 0
COUGH: 1
PHOTOPHOBIA: 0
BLOOD IN STOOL: 0
EYE DISCHARGE: 0
CHEST TIGHTNESS: 0
SORE THROAT: 1
ALLERGIC/IMMUNOLOGIC NEGATIVE: 1
VOMITING: 0
DIARRHEA: 0
TROUBLE SWALLOWING: 0
BACK PAIN: 0
NAUSEA: 0
SHORTNESS OF BREATH: 0
ABDOMINAL PAIN: 0
SINUS PAIN: 0
SINUS PRESSURE: 1
EYE REDNESS: 0

## 2025-04-16 NOTE — PROGRESS NOTES
25  Dai Whitehead : 1950 Sex: female  Age: 74 y.o.      Assessment and Plan:  Dai was seen today for cough, congestion and sore throat.    Diagnoses and all orders for this visit:    Cough, unspecified type  -     POCT COVID-19, Antigen  -     POCT Influenza A/B    Sinobronchitis  -     cefdinir (OMNICEF) 300 MG capsule; Take 1 capsule by mouth 2 times daily for 10 days    Rapid antigen testing for COVID and influenza were negative.  Will treat her sinobronchitis with cefdinir twice daily for 10 days.  Symptomatic treatment can include Tylenol, fluids, rest, Mucinex DM, Claritin, coolmist.  If complaints do not improve, or worsen in any way, present back to the office.    Return 3 to 5-day recheck if not improved.    Chief Complaint   Patient presents with    Cough     Started 3 days ago    Congestion    Sore Throat       Congestion, pressure, drainage, facial tenderness, cough, sore throat, onset 3 days ago.  Denies fever, chills, diaphoresis, nausea, vomiting, decreased oral intake. Denies other GI or  complaints.   OTC treatments minimally effective.          Review of Systems   Constitutional:  Negative for appetite change, fatigue and unexpected weight change.   HENT:  Positive for congestion, postnasal drip, sinus pressure and sore throat. Negative for ear pain, hearing loss, sinus pain and trouble swallowing.    Eyes:  Negative for photophobia, pain, discharge and redness.   Respiratory:  Positive for cough. Negative for chest tightness and shortness of breath.    Cardiovascular:  Negative for chest pain, palpitations and leg swelling.   Gastrointestinal:  Negative for abdominal pain, blood in stool, diarrhea, nausea and vomiting.   Endocrine: Negative.    Genitourinary:  Negative for dysuria, flank pain, frequency and hematuria.   Musculoskeletal:  Negative for arthralgias, back pain, joint swelling and myalgias.   Skin: Negative.    Allergic/Immunologic: Negative.    Neurological:

## 2025-06-12 DIAGNOSIS — E78.2 MIXED HYPERLIPIDEMIA: ICD-10-CM

## 2025-06-12 DIAGNOSIS — I10 ESSENTIAL HYPERTENSION: ICD-10-CM

## 2025-06-12 DIAGNOSIS — K58.0 IRRITABLE BOWEL SYNDROME WITH DIARRHEA: ICD-10-CM

## 2025-06-12 RX ORDER — LISINOPRIL AND HYDROCHLOROTHIAZIDE 20; 25 MG/1; MG/1
1 TABLET ORAL DAILY
Qty: 90 TABLET | Refills: 0 | Status: SHIPPED | OUTPATIENT
Start: 2025-06-12

## 2025-06-12 RX ORDER — EZETIMIBE 10 MG/1
10 TABLET ORAL DAILY
Qty: 90 TABLET | Refills: 0 | Status: SHIPPED | OUTPATIENT
Start: 2025-06-12

## 2025-06-12 RX ORDER — FAMOTIDINE 20 MG/1
20 TABLET, FILM COATED ORAL 2 TIMES DAILY
Qty: 180 TABLET | Refills: 0 | Status: SHIPPED | OUTPATIENT
Start: 2025-06-12

## 2025-07-02 DIAGNOSIS — E78.2 MIXED HYPERLIPIDEMIA: ICD-10-CM

## 2025-07-02 DIAGNOSIS — R73.01 IMPAIRED FASTING GLUCOSE: ICD-10-CM

## 2025-07-02 DIAGNOSIS — E03.9 ACQUIRED HYPOTHYROIDISM: ICD-10-CM

## 2025-07-02 DIAGNOSIS — I10 ESSENTIAL HYPERTENSION: ICD-10-CM

## 2025-07-02 DIAGNOSIS — N18.31 STAGE 3A CHRONIC KIDNEY DISEASE (HCC): ICD-10-CM

## 2025-07-02 LAB
ALBUMIN: 4.2 G/DL (ref 3.5–5.2)
ALP BLD-CCNC: 63 U/L (ref 35–104)
ALT SERPL-CCNC: 12 U/L (ref 0–35)
ANION GAP SERPL CALCULATED.3IONS-SCNC: 13 MMOL/L (ref 7–16)
AST SERPL-CCNC: 21 U/L (ref 0–35)
BACTERIA: ABNORMAL
BASOPHILS ABSOLUTE: 0.03 K/UL (ref 0–0.2)
BASOPHILS RELATIVE PERCENT: 0 % (ref 0–2)
BILIRUB SERPL-MCNC: <0.2 MG/DL (ref 0–1.2)
BILIRUBIN, URINE: NEGATIVE
BUN BLDV-MCNC: 25 MG/DL (ref 8–23)
CALCIUM IONIZED: 1.3 MMOL/L (ref 1.15–1.33)
CALCIUM SERPL-MCNC: 9.8 MG/DL (ref 8.8–10.2)
CHLORIDE BLD-SCNC: 104 MMOL/L (ref 98–107)
CHOLESTEROL, TOTAL: 265 MG/DL
CO2: 26 MMOL/L (ref 22–29)
COLOR, UA: YELLOW
CREAT SERPL-MCNC: 1 MG/DL (ref 0.5–1)
EOSINOPHILS ABSOLUTE: 0.12 K/UL (ref 0.05–0.5)
EOSINOPHILS RELATIVE PERCENT: 1 % (ref 0–6)
EPITHELIAL CELLS, UA: ABNORMAL /HPF
GFR, ESTIMATED: 59 ML/MIN/1.73M2
GLUCOSE BLD-MCNC: 99 MG/DL (ref 74–99)
GLUCOSE URINE: NEGATIVE MG/DL
HBA1C MFR BLD: 5.5 % (ref 4–5.6)
HCT VFR BLD CALC: 40.7 % (ref 34–48)
HDLC SERPL-MCNC: 52 MG/DL
HEMOGLOBIN: 13.4 G/DL (ref 11.5–15.5)
IMMATURE GRANULOCYTES %: 0 % (ref 0–5)
IMMATURE GRANULOCYTES ABSOLUTE: <0.03 K/UL (ref 0–0.58)
KETONES, URINE: NEGATIVE MG/DL
LDL CHOLESTEROL: ABNORMAL MG/DL
LEUKOCYTE ESTERASE, URINE: ABNORMAL
LYMPHOCYTES ABSOLUTE: 4.91 K/UL (ref 1.5–4)
LYMPHOCYTES RELATIVE PERCENT: 57 % (ref 20–42)
MAGNESIUM: 2.1 MG/DL (ref 1.6–2.4)
MCH RBC QN AUTO: 32.2 PG (ref 26–35)
MCHC RBC AUTO-ENTMCNC: 32.9 G/DL (ref 32–34.5)
MCV RBC AUTO: 97.8 FL (ref 80–99.9)
MONOCYTES ABSOLUTE: 0.5 K/UL (ref 0.1–0.95)
MONOCYTES RELATIVE PERCENT: 6 % (ref 2–12)
NEUTROPHILS ABSOLUTE: 3.01 K/UL (ref 1.8–7.3)
NEUTROPHILS RELATIVE PERCENT: 35 % (ref 43–80)
NITRITE, URINE: NEGATIVE
PDW BLD-RTO: 12.4 % (ref 11.5–15)
PH, URINE: 6 (ref 5–8)
PHOSPHORUS: 3.4 MG/DL (ref 2.5–4.5)
PLATELET # BLD: 239 K/UL (ref 130–450)
PMV BLD AUTO: 11.6 FL (ref 7–12)
POTASSIUM SERPL-SCNC: 4.2 MMOL/L (ref 3.5–5.1)
PROTEIN UA: NEGATIVE MG/DL
PTH INTACT: 38 PG/ML (ref 15–65)
RBC # BLD: 4.16 M/UL (ref 3.5–5.5)
RBC UA: ABNORMAL /HPF
RENAL EPITHELIAL, UA: PRESENT /HPF
SODIUM BLD-SCNC: 143 MMOL/L (ref 136–145)
SPECIFIC GRAVITY UA: 1.01 (ref 1–1.03)
T4 FREE: 1.4 NG/DL (ref 0.9–1.7)
TOTAL PROTEIN: 6.8 G/DL (ref 6.4–8.3)
TRIGL SERPL-MCNC: 453 MG/DL
TSH SERPL DL<=0.05 MIU/L-ACNC: 2.36 UIU/ML (ref 0.27–4.2)
TURBIDITY: ABNORMAL
URIC ACID: 7.5 MG/DL (ref 2.4–5.7)
URINE HGB: ABNORMAL
UROBILINOGEN, URINE: 0.2 EU/DL (ref 0–1)
VITAMIN D 25-HYDROXY: 70.8 NG/ML (ref 30–100)
VLDLC SERPL CALC-MCNC: ABNORMAL MG/DL
WBC # BLD: 8.6 K/UL (ref 4.5–11.5)
WBC UA: ABNORMAL /HPF

## 2025-07-08 ENCOUNTER — OFFICE VISIT (OUTPATIENT)
Dept: PRIMARY CARE CLINIC | Age: 75
End: 2025-07-08

## 2025-07-08 VITALS
WEIGHT: 144 LBS | HEIGHT: 59 IN | DIASTOLIC BLOOD PRESSURE: 76 MMHG | BODY MASS INDEX: 29.03 KG/M2 | TEMPERATURE: 97.4 F | SYSTOLIC BLOOD PRESSURE: 136 MMHG | OXYGEN SATURATION: 98 % | HEART RATE: 52 BPM

## 2025-07-08 DIAGNOSIS — E03.9 ACQUIRED HYPOTHYROIDISM: ICD-10-CM

## 2025-07-08 DIAGNOSIS — E78.2 MIXED HYPERLIPIDEMIA: ICD-10-CM

## 2025-07-08 DIAGNOSIS — I10 ESSENTIAL HYPERTENSION: Primary | ICD-10-CM

## 2025-07-08 DIAGNOSIS — K58.0 IRRITABLE BOWEL SYNDROME WITH DIARRHEA: ICD-10-CM

## 2025-07-08 DIAGNOSIS — F51.01 PRIMARY INSOMNIA: ICD-10-CM

## 2025-07-08 RX ORDER — TEMAZEPAM 15 MG/1
15 CAPSULE ORAL NIGHTLY PRN
Qty: 90 CAPSULE | Refills: 1 | Status: SHIPPED | OUTPATIENT
Start: 2025-07-08 | End: 2026-01-04

## 2025-07-08 SDOH — ECONOMIC STABILITY: FOOD INSECURITY: WITHIN THE PAST 12 MONTHS, YOU WORRIED THAT YOUR FOOD WOULD RUN OUT BEFORE YOU GOT MONEY TO BUY MORE.: NEVER TRUE

## 2025-07-08 SDOH — ECONOMIC STABILITY: FOOD INSECURITY: WITHIN THE PAST 12 MONTHS, THE FOOD YOU BOUGHT JUST DIDN'T LAST AND YOU DIDN'T HAVE MONEY TO GET MORE.: NEVER TRUE

## 2025-07-08 SDOH — ECONOMIC STABILITY: INCOME INSECURITY: IN THE LAST 12 MONTHS, WAS THERE A TIME WHEN YOU WERE NOT ABLE TO PAY THE MORTGAGE OR RENT ON TIME?: NO

## 2025-07-08 SDOH — ECONOMIC STABILITY: TRANSPORTATION INSECURITY
IN THE PAST 12 MONTHS, HAS THE LACK OF TRANSPORTATION KEPT YOU FROM MEDICAL APPOINTMENTS OR FROM GETTING MEDICATIONS?: NO

## 2025-07-08 ASSESSMENT — PATIENT HEALTH QUESTIONNAIRE - PHQ9
SUM OF ALL RESPONSES TO PHQ9 QUESTIONS 1 & 2: 1
SUM OF ALL RESPONSES TO PHQ QUESTIONS 1-9: 1
2. FEELING DOWN, DEPRESSED OR HOPELESS: NOT AT ALL
1. LITTLE INTEREST OR PLEASURE IN DOING THINGS: SEVERAL DAYS
SUM OF ALL RESPONSES TO PHQ QUESTIONS 1-9: 1
2. FEELING DOWN, DEPRESSED OR HOPELESS: NOT AT ALL
SUM OF ALL RESPONSES TO PHQ QUESTIONS 1-9: 1
1. LITTLE INTEREST OR PLEASURE IN DOING THINGS: SEVERAL DAYS
SUM OF ALL RESPONSES TO PHQ QUESTIONS 1-9: 1

## 2025-07-08 ASSESSMENT — ENCOUNTER SYMPTOMS
EYES NEGATIVE: 1
ALLERGIC/IMMUNOLOGIC NEGATIVE: 1
RESPIRATORY NEGATIVE: 1
GASTROINTESTINAL NEGATIVE: 1

## 2025-07-08 NOTE — PROGRESS NOTES
1    levothyroxine (SYNTHROID) 100 MCG tablet, Take one tablet by mouth 6 days/week, Disp: 90 tablet, Rfl: 1    azelastine (ASTELIN) 0.1 % nasal spray, 2 sprays by Nasal route 2 times daily Use in each nostril as directed, Disp: 120 mL, Rfl: 1    cetirizine (ZYRTEC) 10 MG tablet, Take 1 tablet by mouth daily, Disp: 30 tablet, Rfl: 0    pantoprazole (PROTONIX) 40 MG tablet, Take 1 tablet by mouth daily, Disp: , Rfl:     Calcium Carbonate-Vit D-Min (CALCIUM 1200) 5805-4520 MG-UNIT CHEW, , Disp: , Rfl:     Cholecalciferol (VITAMIN D3) 1.25 MG (15284 UT) CAPS, Take by mouth, Disp: , Rfl:     aspirin 81 MG tablet, Take 1 tablet by mouth daily Ld 3/21/2019 per dr. Corrigan, Disp: , Rfl:     Allergies   Allergen Reactions    Crestor [Rosuvastatin] Myalgia       Social History     Tobacco Use    Smoking status: Former     Current packs/day: 0.00     Average packs/day: 0.5 packs/day for 40.1 years (20.0 ttl pk-yrs)     Types: Cigarettes     Start date:      Quit date: 2011     Years since quittin.4    Smokeless tobacco: Never   Vaping Use    Vaping status: Never Used   Substance Use Topics    Alcohol use: Yes     Comment: weekends / 2 glasses wine    Drug use: No      Past Surgical History:   Procedure Laterality Date    CATARACT REMOVAL WITH IMPLANT Right 2016     SECTION      x 2    COLONOSCOPY      INTRACAPSULAR CATARACT EXTRACTION Left 2019    LEFT EYE CATARACT EXTRACTION WITH  IOL IMPLANT performed by Maryanne Corrigan MD at [facility] OR    PELVIC LAPAROSCOPY       Family History   Problem Relation Age of Onset    Heart Disease Mother     Other Father         sepsis from TKA    COPD Father     Hypothyroidism Father     Heart Attack Maternal Grandmother     Heart Attack Maternal Grandfather     Cancer Maternal Aunt         Hodgkin lymphoma    No Known Problems Brother      Past Medical History:   Diagnosis Date    Acquired hypothyroidism 2017    Cataract, left eye     Essential

## 2025-07-18 ENCOUNTER — TELEPHONE (OUTPATIENT)
Dept: PRIMARY CARE CLINIC | Age: 75
End: 2025-07-18

## 2025-07-18 DIAGNOSIS — Z12.31 ENCOUNTER FOR SCREENING MAMMOGRAM FOR MALIGNANT NEOPLASM OF BREAST: Primary | ICD-10-CM

## 2025-07-18 NOTE — TELEPHONE ENCOUNTER
Patient due for yearly screening mammogram. No current issues with breast. Order will need sent to [facility]

## 2025-08-04 DIAGNOSIS — I10 ESSENTIAL HYPERTENSION: ICD-10-CM

## 2025-08-05 RX ORDER — DILTIAZEM HYDROCHLORIDE 240 MG/1
240 CAPSULE, EXTENDED RELEASE ORAL DAILY
Qty: 90 CAPSULE | Refills: 0 | Status: SHIPPED | OUTPATIENT
Start: 2025-08-05

## 2025-09-06 DIAGNOSIS — I10 ESSENTIAL HYPERTENSION: ICD-10-CM

## 2025-09-06 DIAGNOSIS — E78.2 MIXED HYPERLIPIDEMIA: ICD-10-CM

## 2025-09-06 RX ORDER — LISINOPRIL AND HYDROCHLOROTHIAZIDE 20; 25 MG/1; MG/1
1 TABLET ORAL DAILY
Qty: 90 TABLET | Refills: 0 | Status: SHIPPED | OUTPATIENT
Start: 2025-09-06

## 2025-09-06 RX ORDER — EZETIMIBE 10 MG/1
10 TABLET ORAL DAILY
Qty: 90 TABLET | Refills: 0 | Status: SHIPPED | OUTPATIENT
Start: 2025-09-06

## (undated) DEVICE — RETRACTOR IRIS FLEX DISP 2 PER BX

## (undated) DEVICE — SOLUTION IV IRRIG WATER 1000ML POUR BRL 2F7114

## (undated) DEVICE — PACK PROC FLD MGMT SYS CENTURION CUST

## (undated) DEVICE — BLADE OPHTH GRN ROUNDED TIP 1 SIDE SHRP GRINDLESS MINI-BLDE

## (undated) DEVICE — Z CONVERTED USE 2273263 SWABSTICK CLN SZ 1S 7.5% PVP IOD SCRB DUO-SWAB

## (undated) DEVICE — PACK PROCEDURE SURG SURG CATARACT CUSTOM

## (undated) DEVICE — SOLUTION IRRIG BSS ST 500ML

## (undated) DEVICE — TOWEL,OR,DSP,ST,BLUE,STD,6/PK,12PK/CS: Brand: MEDLINE

## (undated) DEVICE — SYRINGE, LUER LOCK, 10ML: Brand: MEDLINE

## (undated) DEVICE — GOWN,SIRUS,FABRNF,L,20/CS: Brand: MEDLINE

## (undated) DEVICE — THE MONARCH® "D" CARTRIDGE IS A SINGLE-USE POLYPROPYLENE CARTRIDGE FOR POSTERIOR CHAMBER IOL DELIVERY: Brand: MONARCH® III

## (undated) DEVICE — SATINCRESCENT® KNIFE ANGLED BEVEL UP: Brand: SATINCRESCENT®

## (undated) DEVICE — CANNULA OPHTH 25GA 7 8IN ORNG 45DEG ANG 4MM FR END DOME SHP

## (undated) DEVICE — SINGLE USE MEDICAL DEVICE FOR OPHTHALMIC SURGERY: Brand: SIL. COATED I/A 45 MIL 12/B

## (undated) DEVICE — GOWN,SIRUS,FABRNF,XL,20/CS: Brand: MEDLINE

## (undated) DEVICE — SHIELD EYE W3XL2.5IN UNIV CLR PLAS LTWT

## (undated) DEVICE — GLASSES SAFETY PROTCT GRN

## (undated) DEVICE — CLEARCUT® SLIT KNIFE INTREPID MICRO-COAXIAL SYSTEM 2.4 SB: Brand: CLEARCUT®; INTREPID

## (undated) DEVICE — 1 ML TUBERCULIN SYRINGE,DETACHABLE NEEDLE: Brand: MONOJECT

## (undated) DEVICE — NEEDLE HYPO 25GA L0.625IN BLU POLYPR HUB S STL REG BVL STR

## (undated) DEVICE — SOLUTION IRRIGATION BAL SALT SOLUTION 15 ML STRL BSS

## (undated) DEVICE — 1060 S-DRAPE SPCL INCISE 10/BX 4BX/CS: Brand: STERI-DRAPE™

## (undated) DEVICE — SCALPEL 15 DEG NO 715

## (undated) DEVICE — PROBE HEMSTAT 18GA ERAS BVL TIP STR BPLR WET-FIELD

## (undated) DEVICE — NEEDLE FLTR 18GA L1.5IN MEM THK5UM BLNT DISP

## (undated) DEVICE — 40436 HEAD REST OCULAR: Brand: 40436 HEAD REST OCULAR